# Patient Record
Sex: MALE | Race: WHITE | NOT HISPANIC OR LATINO | Employment: FULL TIME | URBAN - METROPOLITAN AREA
[De-identification: names, ages, dates, MRNs, and addresses within clinical notes are randomized per-mention and may not be internally consistent; named-entity substitution may affect disease eponyms.]

---

## 2021-11-16 ENCOUNTER — OFFICE VISIT (OUTPATIENT)
Dept: FAMILY MEDICINE CLINIC | Facility: CLINIC | Age: 46
End: 2021-11-16
Payer: COMMERCIAL

## 2021-11-16 VITALS
WEIGHT: 155 LBS | SYSTOLIC BLOOD PRESSURE: 126 MMHG | BODY MASS INDEX: 22.96 KG/M2 | HEIGHT: 69 IN | HEART RATE: 96 BPM | TEMPERATURE: 96.2 F | OXYGEN SATURATION: 99 % | DIASTOLIC BLOOD PRESSURE: 84 MMHG

## 2021-11-16 DIAGNOSIS — Z00.00 ANNUAL PHYSICAL EXAM: Primary | ICD-10-CM

## 2021-11-16 DIAGNOSIS — R39.15 URINARY URGENCY: ICD-10-CM

## 2021-11-16 DIAGNOSIS — R51.9 NONINTRACTABLE HEADACHE, UNSPECIFIED CHRONICITY PATTERN, UNSPECIFIED HEADACHE TYPE: ICD-10-CM

## 2021-11-16 DIAGNOSIS — F41.1 GENERALIZED ANXIETY DISORDER: ICD-10-CM

## 2021-11-16 DIAGNOSIS — I10 PRIMARY HYPERTENSION: ICD-10-CM

## 2021-11-16 DIAGNOSIS — R00.2 RAPID PALPITATIONS: ICD-10-CM

## 2021-11-16 DIAGNOSIS — E78.2 MIXED HYPERLIPIDEMIA: ICD-10-CM

## 2021-11-16 DIAGNOSIS — Z12.5 SCREENING FOR PROSTATE CANCER: ICD-10-CM

## 2021-11-16 PROCEDURE — 3008F BODY MASS INDEX DOCD: CPT | Performed by: NURSE PRACTITIONER

## 2021-11-16 PROCEDURE — 93000 ELECTROCARDIOGRAM COMPLETE: CPT | Performed by: NURSE PRACTITIONER

## 2021-11-16 PROCEDURE — 1036F TOBACCO NON-USER: CPT | Performed by: NURSE PRACTITIONER

## 2021-11-16 PROCEDURE — 99386 PREV VISIT NEW AGE 40-64: CPT | Performed by: NURSE PRACTITIONER

## 2021-11-16 PROCEDURE — 3725F SCREEN DEPRESSION PERFORMED: CPT | Performed by: NURSE PRACTITIONER

## 2021-11-16 RX ORDER — CETIRIZINE HYDROCHLORIDE 10 MG/1
10 TABLET ORAL DAILY
COMMUNITY

## 2021-11-16 RX ORDER — ATORVASTATIN CALCIUM 10 MG/1
TABLET, FILM COATED ORAL
COMMUNITY
Start: 2021-10-22 | End: 2021-11-16 | Stop reason: SINTOL

## 2021-11-16 RX ORDER — LISINOPRIL 20 MG/1
20 TABLET ORAL DAILY
COMMUNITY
End: 2021-11-16 | Stop reason: SDUPTHER

## 2021-11-16 RX ORDER — AMITRIPTYLINE HYDROCHLORIDE 50 MG/1
50 TABLET, FILM COATED ORAL
Qty: 90 TABLET | Refills: 1 | Status: SHIPPED | OUTPATIENT
Start: 2021-11-16 | End: 2022-05-19

## 2021-11-16 RX ORDER — BUSPIRONE HYDROCHLORIDE 7.5 MG/1
7.5 TABLET ORAL 2 TIMES DAILY
Qty: 90 TABLET | Refills: 1 | Status: SHIPPED | OUTPATIENT
Start: 2021-11-16 | End: 2022-02-14

## 2021-11-16 RX ORDER — BUSPIRONE HYDROCHLORIDE 7.5 MG/1
7.5 TABLET ORAL 2 TIMES DAILY
COMMUNITY
End: 2021-11-16 | Stop reason: SDUPTHER

## 2021-11-16 RX ORDER — LISINOPRIL 20 MG/1
20 TABLET ORAL DAILY
Qty: 90 TABLET | Refills: 1 | Status: SHIPPED | OUTPATIENT
Start: 2021-11-16 | End: 2022-05-19

## 2021-11-16 RX ORDER — AMITRIPTYLINE HYDROCHLORIDE 50 MG/1
50 TABLET, FILM COATED ORAL
COMMUNITY
End: 2021-11-16 | Stop reason: SDUPTHER

## 2021-11-26 ENCOUNTER — HOSPITAL ENCOUNTER (OUTPATIENT)
Dept: ULTRASOUND IMAGING | Facility: HOSPITAL | Age: 46
Discharge: HOME | End: 2021-11-26
Payer: COMMERCIAL

## 2021-11-26 DIAGNOSIS — R39.15 URINARY URGENCY: ICD-10-CM

## 2021-11-26 PROCEDURE — 76770 US EXAM ABDO BACK WALL COMP: CPT

## 2021-11-29 ENCOUNTER — HOSPITAL ENCOUNTER (OUTPATIENT)
Dept: NON INVASIVE DIAGNOSTICS | Facility: CLINIC | Age: 46
Discharge: HOME | End: 2021-11-29
Payer: COMMERCIAL

## 2021-11-29 DIAGNOSIS — R00.2 RAPID PALPITATIONS: ICD-10-CM

## 2021-11-29 LAB
MAX HR PERCENT: 94 %
MAX HR: 173 BPM
RATE PRESSURE PRODUCT: NORMAL
SL CV STRESS RECOVERY BP: NORMAL MMHG
SL CV STRESS RECOVERY HR: 111 BPM
SL CV STRESS STAGE REACHED: 3
STRESS ANGINA INDEX: 0
STRESS BASELINE BP: NORMAL MMHG
STRESS BASELINE HR: 88 BPM
STRESS O2 SAT REST: 97 %
STRESS PEAK HR: 162 BPM
STRESS PERCENT HR: 94 %
STRESS POST ESTIMATED WORKLOAD: 10.1 METS
STRESS POST EXERCISE DUR MIN: 7 MIN
STRESS POST EXERCISE DUR SEC: 30 SEC
STRESS POST O2 SAT PEAK: 99 %
STRESS POST PEAK BP: 164 MMHG
STRESS TARGET HR: 162 BPM

## 2021-11-29 PROCEDURE — 93018 CV STRESS TEST I&R ONLY: CPT | Performed by: INTERNAL MEDICINE

## 2021-11-29 PROCEDURE — 93016 CV STRESS TEST SUPVJ ONLY: CPT | Performed by: INTERNAL MEDICINE

## 2021-11-29 PROCEDURE — 93017 CV STRESS TEST TRACING ONLY: CPT

## 2021-11-30 LAB
ARRHY DURING EX: NORMAL
CHEST PAIN STATEMENT: NORMAL
MAX DIASTOLIC BP: 82 MMHG
MAX HEART RATE: 164 BPM
MAX PREDICTED HEART RATE: 173 BPM
MAX. SYSTOLIC BP: 164 MMHG
PROTOCOL NAME: NORMAL
REASON FOR TERMINATION: NORMAL
TARGET HR FORMULA: NORMAL
TEST INDICATION: NORMAL
TIME IN EXERCISE PHASE: NORMAL

## 2022-02-02 ENCOUNTER — TELEPHONE (OUTPATIENT)
Dept: FAMILY MEDICINE CLINIC | Facility: CLINIC | Age: 47
End: 2022-02-02

## 2022-02-02 DIAGNOSIS — F40.243 FEAR OF FLYING: Primary | ICD-10-CM

## 2022-02-02 RX ORDER — LORAZEPAM 0.5 MG/1
TABLET ORAL
Qty: 6 TABLET | Refills: 0 | Status: SHIPPED | OUTPATIENT
Start: 2022-02-02 | End: 2022-05-20 | Stop reason: SDUPTHER

## 2022-02-02 NOTE — TELEPHONE ENCOUNTER
Rx sent for lorazepam 0 5 mg to use prior to his flight sent to [facility] in [place] thanks

## 2022-02-02 NOTE — TELEPHONE ENCOUNTER
When in last for his visit he mentioned his fear of flying and you told him when it got closer to time to fly to let you know and you would send in something - [facility] [place]    Could you please do that

## 2022-02-12 DIAGNOSIS — F41.1 GENERALIZED ANXIETY DISORDER: ICD-10-CM

## 2022-02-14 RX ORDER — BUSPIRONE HYDROCHLORIDE 7.5 MG/1
TABLET ORAL
Qty: 180 TABLET | Refills: 0 | Status: SHIPPED | OUTPATIENT
Start: 2022-02-14 | End: 2022-05-19

## 2022-05-19 DIAGNOSIS — I10 PRIMARY HYPERTENSION: ICD-10-CM

## 2022-05-19 DIAGNOSIS — F41.1 GENERALIZED ANXIETY DISORDER: ICD-10-CM

## 2022-05-19 RX ORDER — LISINOPRIL 20 MG/1
TABLET ORAL
Qty: 90 TABLET | Refills: 1 | Status: SHIPPED | OUTPATIENT
Start: 2022-05-19

## 2022-05-19 RX ORDER — AMITRIPTYLINE HYDROCHLORIDE 50 MG/1
TABLET, FILM COATED ORAL
Qty: 90 TABLET | Refills: 1 | Status: SHIPPED | OUTPATIENT
Start: 2022-05-19

## 2022-05-19 RX ORDER — BUSPIRONE HYDROCHLORIDE 7.5 MG/1
TABLET ORAL
Qty: 180 TABLET | Refills: 0 | Status: SHIPPED | OUTPATIENT
Start: 2022-05-19

## 2022-05-20 DIAGNOSIS — F40.243 FEAR OF FLYING: ICD-10-CM

## 2022-05-20 RX ORDER — LORAZEPAM 0.5 MG/1
TABLET ORAL
Qty: 6 TABLET | Refills: 0 | Status: SHIPPED | OUTPATIENT
Start: 2022-05-20

## 2022-08-16 DIAGNOSIS — F41.1 GENERALIZED ANXIETY DISORDER: ICD-10-CM

## 2022-08-16 RX ORDER — BUSPIRONE HYDROCHLORIDE 7.5 MG/1
TABLET ORAL
Qty: 180 TABLET | Refills: 0 | Status: SHIPPED | OUTPATIENT
Start: 2022-08-16

## 2022-10-12 PROBLEM — Z12.5 SCREENING FOR PROSTATE CANCER: Status: RESOLVED | Noted: 2021-11-16 | Resolved: 2022-10-12

## 2022-11-09 DIAGNOSIS — F41.1 GENERALIZED ANXIETY DISORDER: ICD-10-CM

## 2022-11-09 RX ORDER — BUSPIRONE HYDROCHLORIDE 7.5 MG/1
TABLET ORAL
Qty: 180 TABLET | Refills: 0 | Status: SHIPPED | OUTPATIENT
Start: 2022-11-09

## 2022-11-12 DIAGNOSIS — I10 PRIMARY HYPERTENSION: ICD-10-CM

## 2022-11-12 DIAGNOSIS — F41.1 GENERALIZED ANXIETY DISORDER: ICD-10-CM

## 2022-11-14 RX ORDER — LISINOPRIL 20 MG/1
TABLET ORAL
Qty: 90 TABLET | Refills: 1 | Status: SHIPPED | OUTPATIENT
Start: 2022-11-14

## 2022-11-14 RX ORDER — AMITRIPTYLINE HYDROCHLORIDE 50 MG/1
TABLET, FILM COATED ORAL
Qty: 90 TABLET | Refills: 1 | Status: SHIPPED | OUTPATIENT
Start: 2022-11-14

## 2022-12-22 ENCOUNTER — OFFICE VISIT (OUTPATIENT)
Dept: FAMILY MEDICINE CLINIC | Facility: CLINIC | Age: 47
End: 2022-12-22

## 2022-12-22 VITALS
WEIGHT: 154.6 LBS | DIASTOLIC BLOOD PRESSURE: 82 MMHG | BODY MASS INDEX: 22.9 KG/M2 | TEMPERATURE: 97.8 F | OXYGEN SATURATION: 96 % | HEIGHT: 69 IN | HEART RATE: 83 BPM | SYSTOLIC BLOOD PRESSURE: 124 MMHG

## 2022-12-22 DIAGNOSIS — I10 PRIMARY HYPERTENSION: ICD-10-CM

## 2022-12-22 DIAGNOSIS — Z13.1 SCREENING FOR DIABETES MELLITUS: ICD-10-CM

## 2022-12-22 DIAGNOSIS — F41.1 GENERALIZED ANXIETY DISORDER: ICD-10-CM

## 2022-12-22 DIAGNOSIS — Z12.11 SCREEN FOR COLON CANCER: ICD-10-CM

## 2022-12-22 DIAGNOSIS — E78.2 MIXED HYPERLIPIDEMIA: ICD-10-CM

## 2022-12-22 DIAGNOSIS — R51.9 NONINTRACTABLE HEADACHE, UNSPECIFIED CHRONICITY PATTERN, UNSPECIFIED HEADACHE TYPE: ICD-10-CM

## 2022-12-22 DIAGNOSIS — Z00.00 ANNUAL PHYSICAL EXAM: Primary | ICD-10-CM

## 2022-12-22 PROBLEM — R39.15 URINARY URGENCY: Status: RESOLVED | Noted: 2021-11-16 | Resolved: 2022-12-22

## 2022-12-22 PROBLEM — R00.2 RAPID PALPITATIONS: Status: RESOLVED | Noted: 2021-11-16 | Resolved: 2022-12-22

## 2022-12-22 RX ORDER — LISINOPRIL 20 MG/1
20 TABLET ORAL DAILY
Qty: 90 TABLET | Refills: 3 | Status: SHIPPED | OUTPATIENT
Start: 2022-12-22

## 2022-12-22 RX ORDER — BUSPIRONE HYDROCHLORIDE 7.5 MG/1
7.5 TABLET ORAL 2 TIMES DAILY
Qty: 180 TABLET | Refills: 3 | Status: SHIPPED | OUTPATIENT
Start: 2022-12-22 | End: 2023-12-22

## 2022-12-22 RX ORDER — AMITRIPTYLINE HYDROCHLORIDE 50 MG/1
50 TABLET, FILM COATED ORAL
Qty: 90 TABLET | Refills: 3 | Status: SHIPPED | OUTPATIENT
Start: 2022-12-22

## 2022-12-22 NOTE — PATIENT INSTRUCTIONS

## 2022-12-22 NOTE — PROGRESS NOTES
ADULT ANNUAL [facility]    NAME: Pratima Sullivan  AGE: 49 y o  SEX: male  : 1975     DATE: 2022     Assessment and Plan:     Problem List Items Addressed This Visit        Cardiovascular and Mediastinum    HTN (hypertension)     Patient BP is well controlled on the Lisinopril 20 mg          Relevant Medications    lisinopril (ZESTRIL) 20 mg tablet    Other Relevant Orders    Comprehensive metabolic panel    CBC and differential       Other    Generalized anxiety disorder     Doing well on the Buspar and Amitriptyline          Relevant Medications    busPIRone (BUSPAR) 7 5 mg tablet    amitriptyline (ELAVIL) 50 mg tablet    Other Relevant Orders    Comprehensive metabolic panel    Lipid Panel with Direct LDL reflex    Mixed hyperlipidemia     Not on a cholesterol medication          Relevant Orders    Comprehensive metabolic panel    Lipid Panel with Direct LDL reflex    Annual physical exam - Primary    Nonintractable headache     Doing well on the Elavil          Screening for diabetes mellitus    Relevant Orders    HEMOGLOBIN A1C W/ EAG ESTIMATION    Body mass index (BMI) of 23 0 to 23 9 in adult    Screen for colon cancer    Relevant Orders    Cologuard       Immunizations and preventive care screenings were discussed with patient today  Appropriate education was printed on patient's after visit summary  Counseling:  Injury prevention: discussed safety/seat belts, safety helmets, smoke detectors, carbon dioxide detectors, and smoking near bedding or upholstery  Exercise: the importance of regular exercise/physical activity was discussed  Recommend exercise 3-5 times per week for at least 30 minutes  Depression Screening and Follow-up Plan: Patient was screened for depression during today's encounter  They screened negative with a PHQ-2 score of 0  Return in 1 year (on 2023)       Chief Complaint:     Chief Complaint   Patient presents with   • Well Check      History of Present Illness:     Adult Annual Physical   Patient here for a comprehensive physical exam  The patient reports no problems  Diet and Physical Activity  Diet/Nutrition: well balanced diet  Exercise: walking  Depression Screening  PHQ-2/9 Depression Screening    Little interest or pleasure in doing things: 0 - not at all  Feeling down, depressed, or hopeless: 0 - not at all  PHQ-2 Score: 0  PHQ-2 Interpretation: Negative depression screen       General Health  Sleep: sleeps well and gets 7-8 hours of sleep on average  Hearing: normal - bilateral   Vision: goes for regular eye exams, most recent eye exam >1 year ago and wears glasses  Dental: regular dental visits, brushes teeth twice daily and flosses teeth occasionally   Health  Symptoms include: urinary frequency     Review of Systems:     Review of Systems   Constitutional: Negative for activity change, appetite change, chills, diaphoresis, fatigue, fever and unexpected weight change  HENT: Positive for dental problem  Negative for congestion, ear pain, hearing loss, postnasal drip, sinus pressure, sinus pain, sneezing, sore throat and trouble swallowing  Eyes: Negative for pain, redness and visual disturbance  Respiratory: Negative for cough and shortness of breath  Cardiovascular: Negative for chest pain and leg swelling  Gastrointestinal: Negative for abdominal pain, diarrhea, nausea and vomiting  Endocrine: Negative  Genitourinary: Negative  Musculoskeletal: Negative for arthralgias  Skin: Negative  Allergic/Immunologic: Negative  Neurological: Positive for headaches  Negative for dizziness and light-headedness  Hematological: Negative  Psychiatric/Behavioral: Negative for behavioral problems and dysphoric mood  Past Medical History:     No past medical history on file     Past Surgical History:     Past Surgical History:   Procedure Laterality Date   • NO PAST SURGERIES        Family History:     No family history on file  Social History:     Social History     Socioeconomic History   • Marital status: Single     Spouse name: None   • Number of children: None   • Years of education: None   • Highest education level: None   Occupational History   • None   Tobacco Use   • Smoking status: Never   • Smokeless tobacco: Never   Substance and Sexual Activity   • Alcohol use: Not Currently   • Drug use: Not Currently   • Sexual activity: Yes   Other Topics Concern   • None   Social History Narrative   • None     Social Determinants of Health     Financial Resource Strain: Not on file   Food Insecurity: Not on file   Transportation Needs: Not on file   Physical Activity: Not on file   Stress: Not on file   Social Connections: Not on file   Intimate Partner Violence: Not on file   Housing Stability: Not on file      Current Medications:     Current Outpatient Medications   Medication Sig Dispense Refill   • amitriptyline (ELAVIL) 50 mg tablet Take 1 tablet (50 mg total) by mouth daily at bedtime 90 tablet 3   • busPIRone (BUSPAR) 7 5 mg tablet Take 1 tablet (7 5 mg total) by mouth 2 (two) times a day 180 tablet 3   • cetirizine (ZyrTEC) 10 mg tablet Take 10 mg by mouth daily     • lisinopril (ZESTRIL) 20 mg tablet Take 1 tablet (20 mg total) by mouth daily 90 tablet 3   • LORazepam (Ativan) 0 5 mg tablet Take one 30 minutes prior to flight may repeat x 1 6 tablet 0     No current facility-administered medications for this visit  Allergies:     No Known Allergies   Physical Exam:     /82 (BP Location: Left arm, Patient Position: Sitting)   Pulse 83   Temp 97 8 °F (36 6 °C) (Temporal)   Ht 5' 8 5" (1 74 m)   Wt 70 1 kg (154 lb 9 6 oz)   SpO2 96%   BMI 23 16 kg/m²     Physical Exam  Vitals and nursing note reviewed  Constitutional:       General: He is not in acute distress  Appearance: He is well-developed     HENT:      Head: Normocephalic and atraumatic  Right Ear: Tympanic membrane normal       Left Ear: Tympanic membrane normal       Nose: Nose normal       Mouth/Throat:      Mouth: Mucous membranes are moist    Eyes:      Conjunctiva/sclera: Conjunctivae normal    Cardiovascular:      Rate and Rhythm: Normal rate and regular rhythm  Heart sounds: No murmur heard  Pulmonary:      Effort: Pulmonary effort is normal  No respiratory distress  Breath sounds: Normal breath sounds  Abdominal:      Palpations: Abdomen is soft  Tenderness: There is no abdominal tenderness  Musculoskeletal:         General: No swelling  Normal range of motion  Cervical back: Neck supple  Skin:     General: Skin is warm and dry  Capillary Refill: Capillary refill takes less than 2 seconds  Neurological:      General: No focal deficit present  Mental Status: He is alert and oriented to person, place, and time  Psychiatric:         Mood and Affect: Mood normal        DEMETRIA-7 Flowsheet Screening    Flowsheet Row Most Recent Value   Over the last 2 weeks, how often have you been bothered by any of the following problems?     Feeling nervous, anxious, or on edge 0   Not being able to stop or control worrying 0   Worrying too much about different things 0   Trouble relaxing 0   Being so restless that it is hard to sit still 0   Becoming easily annoyed or irritable 0   Feeling afraid as if something awful might happen 0   DEMETRIA-7 Total Score 0           Alexi Tapia, [address]

## 2022-12-26 LAB — HBA1C MFR BLD HPLC: 5.6 %

## 2022-12-28 DIAGNOSIS — E78.2 MIXED HYPERLIPIDEMIA: Primary | ICD-10-CM

## 2022-12-28 RX ORDER — ROSUVASTATIN CALCIUM 5 MG/1
5 TABLET, COATED ORAL DAILY
Qty: 90 TABLET | Refills: 3 | Status: SHIPPED | OUTPATIENT
Start: 2022-12-28

## 2023-02-20 PROBLEM — Z12.11 SCREEN FOR COLON CANCER: Status: RESOLVED | Noted: 2022-12-22 | Resolved: 2023-02-20

## 2023-02-20 PROBLEM — Z13.1 SCREENING FOR DIABETES MELLITUS: Status: RESOLVED | Noted: 2022-12-22 | Resolved: 2023-02-20

## 2023-06-20 DIAGNOSIS — F41.1 GENERALIZED ANXIETY DISORDER: ICD-10-CM

## 2023-06-20 DIAGNOSIS — I10 PRIMARY HYPERTENSION: ICD-10-CM

## 2023-06-20 RX ORDER — LISINOPRIL 20 MG/1
20 TABLET ORAL DAILY
Qty: 90 TABLET | Refills: 3 | Status: SHIPPED | OUTPATIENT
Start: 2023-06-20

## 2023-06-20 RX ORDER — BUSPIRONE HYDROCHLORIDE 7.5 MG/1
7.5 TABLET ORAL 2 TIMES DAILY
Qty: 180 TABLET | Refills: 3 | Status: SHIPPED | OUTPATIENT
Start: 2023-06-20 | End: 2024-06-19

## 2023-09-15 DIAGNOSIS — F41.1 GENERALIZED ANXIETY DISORDER: ICD-10-CM

## 2023-09-15 RX ORDER — AMITRIPTYLINE HYDROCHLORIDE 50 MG/1
50 TABLET, FILM COATED ORAL
Qty: 90 TABLET | Refills: 0 | Status: SHIPPED | OUTPATIENT
Start: 2023-09-15

## 2023-09-21 DIAGNOSIS — F41.1 GENERALIZED ANXIETY DISORDER: ICD-10-CM

## 2023-09-21 RX ORDER — BUSPIRONE HYDROCHLORIDE 7.5 MG/1
7.5 TABLET ORAL 2 TIMES DAILY
Qty: 180 TABLET | Refills: 3 | Status: SHIPPED | OUTPATIENT
Start: 2023-09-21 | End: 2024-09-20

## 2023-11-08 DIAGNOSIS — F41.1 GENERALIZED ANXIETY DISORDER: ICD-10-CM

## 2023-11-08 RX ORDER — AMITRIPTYLINE HYDROCHLORIDE 50 MG/1
50 TABLET, FILM COATED ORAL
Qty: 90 TABLET | Refills: 0 | Status: SHIPPED | OUTPATIENT
Start: 2023-11-08

## 2024-03-03 DIAGNOSIS — F41.1 GENERALIZED ANXIETY DISORDER: ICD-10-CM

## 2024-03-04 RX ORDER — AMITRIPTYLINE HYDROCHLORIDE 50 MG/1
50 TABLET, FILM COATED ORAL
Qty: 90 TABLET | Refills: 0 | Status: SHIPPED | OUTPATIENT
Start: 2024-03-04

## 2024-03-15 ENCOUNTER — OFFICE VISIT (OUTPATIENT)
Dept: FAMILY MEDICINE CLINIC | Facility: CLINIC | Age: 49
End: 2024-03-15
Payer: COMMERCIAL

## 2024-03-15 VITALS
TEMPERATURE: 98 F | HEART RATE: 100 BPM | DIASTOLIC BLOOD PRESSURE: 74 MMHG | SYSTOLIC BLOOD PRESSURE: 122 MMHG | HEIGHT: 69 IN | WEIGHT: 161 LBS | BODY MASS INDEX: 23.85 KG/M2 | OXYGEN SATURATION: 96 %

## 2024-03-15 DIAGNOSIS — Z12.5 SCREENING FOR PROSTATE CANCER: ICD-10-CM

## 2024-03-15 DIAGNOSIS — Z12.11 SCREEN FOR COLON CANCER: ICD-10-CM

## 2024-03-15 DIAGNOSIS — R51.9 NONINTRACTABLE HEADACHE, UNSPECIFIED CHRONICITY PATTERN, UNSPECIFIED HEADACHE TYPE: ICD-10-CM

## 2024-03-15 DIAGNOSIS — M25.50 ARTHRALGIA, UNSPECIFIED JOINT: ICD-10-CM

## 2024-03-15 DIAGNOSIS — E78.2 MIXED HYPERLIPIDEMIA: ICD-10-CM

## 2024-03-15 DIAGNOSIS — F41.1 GENERALIZED ANXIETY DISORDER: ICD-10-CM

## 2024-03-15 DIAGNOSIS — I10 PRIMARY HYPERTENSION: ICD-10-CM

## 2024-03-15 DIAGNOSIS — Z00.00 ANNUAL PHYSICAL EXAM: Primary | ICD-10-CM

## 2024-03-15 PROCEDURE — 99396 PREV VISIT EST AGE 40-64: CPT | Performed by: NURSE PRACTITIONER

## 2024-03-15 NOTE — PROGRESS NOTES
ADULT ANNUAL PHYSICAL  [facility]    NAME: Robinson Chaudhari  AGE: 49 y.o. SEX: male  : 1975     DATE: 3/15/2024     Assessment and Plan:     Problem List Items Addressed This Visit        Cardiovascular and Mediastinum    HTN (hypertension)     Well controlled on the Lisinopril 20 mg          Relevant Orders    Comprehensive metabolic panel    Lipid panel    CBC and differential       Behavioral Health    Generalized anxiety disorder     Taking the Elavil and Buspar          Relevant Orders    Comprehensive metabolic panel    TSH, 3rd generation with Free T4 reflex    CBC and differential       Surgery/Wound/Pain    RESOLVED: Nonintractable headache       Other    Mixed hyperlipidemia    Relevant Orders    Lipid panel    Annual physical exam - Primary    Body mass index (BMI) of 23.0 to 23.9 in adult    Screen for colon cancer    Relevant Orders    Cologuard   Other Visit Diagnoses     Screening for prostate cancer        Relevant Orders    PSA, Total Screen    Arthralgia, unspecified joint        Relevant Orders    Comprehensive metabolic panel    CBC and differential    Lyme Total AB W Reflex to IGM/IGG    Sedimentation rate, automated    C-reactive protein    ÁNGEL Screen w/ Reflex to Titer/Pattern            Immunizations and preventive care screenings were discussed with patient today. Appropriate education was printed on patient's after visit summary.    Discussed risks and benefits of prostate cancer screening. We discussed the controversial history of PSA screening for prostate cancer in the [place] as well as the risk of over detection and over treatment of prostate cancer by way of PSA screening.  The patient understands that PSA blood testing is an imperfect way to screen for prostate cancer and that elevated PSA levels in the blood may also be caused by infection, inflammation, prostatic trauma or manipulation, urological procedures,  or by benign prostatic enlargement.    The role of the digital rectal examination in prostate cancer screening was also discussed and I discussed with him that there is large interobserver variability in the findings of digital rectal examination.    Counseling:  Alcohol/drug use: discussed moderation in alcohol intake, the recommendations for healthy alcohol use, and avoidance of illicit drug use.  Exercise: the importance of regular exercise/physical activity was discussed. Recommend exercise 3-5 times per week for at least 30 minutes.       Depression Screening and Follow-up Plan: Patient was screened for depression during today's encounter. They screened negative with a PHQ-2 score of 0.    Tobacco Cessation Counseling: Tobacco cessation counseling was provided. The patient is sincerely urged to quit consumption of tobacco. He is not ready to quit tobacco.         No follow-ups on file.     Chief Complaint:     Chief Complaint   Patient presents with   • Annual Exam      History of Present Illness:     Adult Annual Physical   Patient here for a comprehensive physical exam. The patient reports no problems.  Patient here today for his check up and reports that he is feeling that he is having arthritis and when he is filling out forms and using a pen has soreness and reports that when he goes up the steps having pain and has joint pains has pain and stiffness with waking up in the AM. [place] and walking and has soreness and pain and pushes through and is in pain. Patient having stuttering. Patient has not had labs completed. Patient son was diagnosed with lyme and possible lupus.        Diet and Physical Activity  Diet/Nutrition: well balanced diet.   Exercise: walking.      Depression Screening  PHQ-2/9 Depression Screening    Little interest or pleasure in doing things: 0 - not at all  Feeling down, depressed, or hopeless: 0 - not at all  PHQ-2 Score: 0  PHQ-2  Interpretation: Negative depression screen       General Health  Sleep: sleeps well.   Hearing: normal - bilateral.  Vision: no vision problems, most recent eye exam <1 year ago, and wears glasses.   Dental: regular dental visits.        Health  Symptoms include: none    Advanced Care Planning  Do you have an advanced directive? no  Do you have a durable medical power of ? no  ACP document given to patient? no     Review of Systems:     Review of Systems   Constitutional:  Negative for activity change, appetite change, chills, diaphoresis, fatigue, fever and unexpected weight change.   HENT:  Negative for congestion, ear pain, hearing loss, postnasal drip, sinus pressure, sinus pain, sneezing and sore throat.    Eyes:  Negative for pain, redness and visual disturbance.   Respiratory:  Negative for cough and shortness of breath.    Cardiovascular:  Negative for chest pain and leg swelling.   Gastrointestinal:  Negative for abdominal pain, diarrhea, nausea and vomiting.   Endocrine: Negative.    Genitourinary: Negative.    Musculoskeletal:  Positive for arthralgias and myalgias.   Skin: Negative.    Allergic/Immunologic: Negative.    Neurological:  Negative for dizziness and light-headedness.   Hematological: Negative.    Psychiatric/Behavioral:  Negative for behavioral problems and dysphoric mood.       Past Medical History:     No past medical history on file.   Past Surgical History:     Past Surgical History:   Procedure Laterality Date   • NO PAST SURGERIES        Family History:     No family history on file.   Social History:     Social History     Socioeconomic History   • Marital status: Single     Spouse name: None   • Number of children: None   • Years of education: None   • Highest education level: None   Occupational History   • None   Tobacco Use   • Smoking status: Never   • Smokeless tobacco: Never   Substance and Sexual Activity   • Alcohol use: Not Currently   • Drug use: Not Currently   •  "Sexual activity: Yes   Other Topics Concern   • None   Social History Narrative   • None     Social Determinants of Health     Financial Resource Strain: Not on file   Food Insecurity: Not on file   Transportation Needs: Not on file   Physical Activity: Not on file   Stress: Not on file   Social Connections: Not on file   Intimate Partner Violence: Not on file   Housing Stability: Not on file      Current Medications:     Current Outpatient Medications   Medication Sig Dispense Refill   • amitriptyline (ELAVIL) 50 mg tablet TAKE 1 TABLET BY MOUTH EVERYDAY AT BEDTIME 90 tablet 0   • busPIRone (BUSPAR) 7.5 mg tablet Take 1 tablet (7.5 mg total) by mouth 2 (two) times a day 180 tablet 3   • cetirizine (ZyrTEC) 10 mg tablet Take 10 mg by mouth daily     • lisinopril (ZESTRIL) 20 mg tablet Take 1 tablet (20 mg total) by mouth daily 90 tablet 3   • LORazepam (Ativan) 0.5 mg tablet Take one 30 minutes prior to flight may repeat x 1 6 tablet 0     No current facility-administered medications for this visit.      Allergies:     No Known Allergies   Physical Exam:     /74 (BP Location: Left arm, Patient Position: Sitting)   Pulse 100   Temp 98 °F (36.7 °C) (Temporal)   Ht 5' 8.5\" (1.74 m)   Wt 73 kg (161 lb)   SpO2 96%   BMI 24.12 kg/m²     Physical Exam  Vitals and nursing note reviewed.   Constitutional:       General: He is not in acute distress.     Appearance: He is well-developed.   HENT:      Head: Normocephalic and atraumatic.      Right Ear: Tympanic membrane normal.      Left Ear: Tympanic membrane normal.      Nose: Nose normal.      Mouth/Throat:      Mouth: Mucous membranes are moist.   Eyes:      Conjunctiva/sclera: Conjunctivae normal.   Cardiovascular:      Rate and Rhythm: Normal rate and regular rhythm.      Heart sounds: No murmur heard.  Pulmonary:      Effort: Pulmonary effort is normal. No respiratory distress.      Breath sounds: Normal breath sounds.   Abdominal:      Palpations: Abdomen is " soft.      Tenderness: There is no abdominal tenderness.   Musculoskeletal:         General: No swelling.      Cervical back: Neck supple.   Skin:     General: Skin is warm and dry.      Capillary Refill: Capillary refill takes less than 2 seconds.   Neurological:      General: No focal deficit present.      Mental Status: He is alert and oriented to person, place, and time.   Psychiatric:         Mood and Affect: Mood normal.        DEMETRIA-7 Flowsheet Screening    Flowsheet Row Most Recent Value   Over the last 2 weeks, how often have you been bothered by any of the following problems?    Feeling nervous, anxious, or on edge 1   Not being able to stop or control worrying 1   Worrying too much about different things 1   Trouble relaxing 0   Being so restless that it is hard to sit still 1   Becoming easily annoyed or irritable 0   Feeling afraid as if something awful might happen 1   DEMETRIA-7 Total Score 5         ARACELIS Barajas  [facility]

## 2024-03-16 ENCOUNTER — APPOINTMENT (OUTPATIENT)
Dept: LAB | Facility: CLINIC | Age: 49
End: 2024-03-16
Payer: COMMERCIAL

## 2024-03-16 DIAGNOSIS — I10 PRIMARY HYPERTENSION: ICD-10-CM

## 2024-03-16 DIAGNOSIS — F41.1 GENERALIZED ANXIETY DISORDER: ICD-10-CM

## 2024-03-16 DIAGNOSIS — E78.2 MIXED HYPERLIPIDEMIA: ICD-10-CM

## 2024-03-16 DIAGNOSIS — Z12.5 SCREENING FOR PROSTATE CANCER: ICD-10-CM

## 2024-03-16 DIAGNOSIS — M25.50 ARTHRALGIA, UNSPECIFIED JOINT: ICD-10-CM

## 2024-03-16 LAB
ALBUMIN SERPL BCP-MCNC: 4.3 G/DL (ref 3.5–5)
ALP SERPL-CCNC: 76 U/L (ref 34–104)
ALT SERPL W P-5'-P-CCNC: 30 U/L (ref 7–52)
ANION GAP SERPL CALCULATED.3IONS-SCNC: 10 MMOL/L (ref 4–13)
AST SERPL W P-5'-P-CCNC: 21 U/L (ref 13–39)
BASOPHILS # BLD AUTO: 0.06 THOUSANDS/ÂΜL (ref 0–0.1)
BASOPHILS NFR BLD AUTO: 1 % (ref 0–1)
BILIRUB SERPL-MCNC: 1.01 MG/DL (ref 0.2–1)
BUN SERPL-MCNC: 13 MG/DL (ref 5–25)
CALCIUM SERPL-MCNC: 9 MG/DL (ref 8.4–10.2)
CHLORIDE SERPL-SCNC: 104 MMOL/L (ref 96–108)
CHOLEST SERPL-MCNC: 259 MG/DL
CO2 SERPL-SCNC: 25 MMOL/L (ref 21–32)
CREAT SERPL-MCNC: 1.06 MG/DL (ref 0.6–1.3)
CRP SERPL QL: 2.7 MG/L
EOSINOPHIL # BLD AUTO: 0.2 THOUSAND/ÂΜL (ref 0–0.61)
EOSINOPHIL NFR BLD AUTO: 3 % (ref 0–6)
ERYTHROCYTE [DISTWIDTH] IN BLOOD BY AUTOMATED COUNT: 12.7 % (ref 11.6–15.1)
ERYTHROCYTE [SEDIMENTATION RATE] IN BLOOD: 16 MM/HOUR (ref 0–19)
GFR SERPL CREATININE-BSD FRML MDRD: 81 ML/MIN/1.73SQ M
GLUCOSE P FAST SERPL-MCNC: 87 MG/DL (ref 65–99)
HCT VFR BLD AUTO: 48 % (ref 36.5–49.3)
HDLC SERPL-MCNC: 40 MG/DL
HGB BLD-MCNC: 16.1 G/DL (ref 12–17)
IMM GRANULOCYTES # BLD AUTO: 0.02 THOUSAND/UL (ref 0–0.2)
IMM GRANULOCYTES NFR BLD AUTO: 0 % (ref 0–2)
LDLC SERPL CALC-MCNC: 178 MG/DL (ref 0–100)
LYMPHOCYTES # BLD AUTO: 2.15 THOUSANDS/ÂΜL (ref 0.6–4.47)
LYMPHOCYTES NFR BLD AUTO: 28 % (ref 14–44)
MCH RBC QN AUTO: 28.2 PG (ref 26.8–34.3)
MCHC RBC AUTO-ENTMCNC: 33.5 G/DL (ref 31.4–37.4)
MCV RBC AUTO: 84 FL (ref 82–98)
MONOCYTES # BLD AUTO: 0.67 THOUSAND/ÂΜL (ref 0.17–1.22)
MONOCYTES NFR BLD AUTO: 9 % (ref 4–12)
NEUTROPHILS # BLD AUTO: 4.54 THOUSANDS/ÂΜL (ref 1.85–7.62)
NEUTS SEG NFR BLD AUTO: 59 % (ref 43–75)
NONHDLC SERPL-MCNC: 219 MG/DL
NRBC BLD AUTO-RTO: 0 /100 WBCS
PLATELET # BLD AUTO: 224 THOUSANDS/UL (ref 149–390)
PMV BLD AUTO: 12.2 FL (ref 8.9–12.7)
POTASSIUM SERPL-SCNC: 4.2 MMOL/L (ref 3.5–5.3)
PROT SERPL-MCNC: 7 G/DL (ref 6.4–8.4)
PSA SERPL-MCNC: 1.28 NG/ML (ref 0–4)
RBC # BLD AUTO: 5.7 MILLION/UL (ref 3.88–5.62)
SODIUM SERPL-SCNC: 139 MMOL/L (ref 135–147)
TRIGL SERPL-MCNC: 207 MG/DL
TSH SERPL DL<=0.05 MIU/L-ACNC: 1.8 UIU/ML (ref 0.45–4.5)
WBC # BLD AUTO: 7.64 THOUSAND/UL (ref 4.31–10.16)

## 2024-03-16 PROCEDURE — 36415 COLL VENOUS BLD VENIPUNCTURE: CPT

## 2024-03-16 PROCEDURE — G0103 PSA SCREENING: HCPCS

## 2024-03-16 PROCEDURE — 80061 LIPID PANEL: CPT

## 2024-03-16 PROCEDURE — 86140 C-REACTIVE PROTEIN: CPT

## 2024-03-16 PROCEDURE — 84443 ASSAY THYROID STIM HORMONE: CPT

## 2024-03-16 PROCEDURE — 85025 COMPLETE CBC W/AUTO DIFF WBC: CPT

## 2024-03-16 PROCEDURE — 86618 LYME DISEASE ANTIBODY: CPT

## 2024-03-16 PROCEDURE — 86038 ANTINUCLEAR ANTIBODIES: CPT

## 2024-03-16 PROCEDURE — 80053 COMPREHEN METABOLIC PANEL: CPT

## 2024-03-16 PROCEDURE — 85652 RBC SED RATE AUTOMATED: CPT

## 2024-03-17 LAB
ANA SER QL IA: NEGATIVE
B BURGDOR IGG+IGM SER QL IA: NEGATIVE

## 2024-05-01 PROBLEM — Z12.11 SCREEN FOR COLON CANCER: Status: RESOLVED | Noted: 2024-03-15 | Resolved: 2024-05-01

## 2024-06-06 DIAGNOSIS — F41.1 GENERALIZED ANXIETY DISORDER: ICD-10-CM

## 2024-06-06 RX ORDER — AMITRIPTYLINE HYDROCHLORIDE 50 MG/1
50 TABLET, FILM COATED ORAL
Qty: 90 TABLET | Refills: 1 | Status: SHIPPED | OUTPATIENT
Start: 2024-06-06

## 2024-08-05 DIAGNOSIS — I10 PRIMARY HYPERTENSION: ICD-10-CM

## 2024-08-05 RX ORDER — LISINOPRIL 20 MG/1
20 TABLET ORAL DAILY
Qty: 100 TABLET | Refills: 1 | Status: SHIPPED | OUTPATIENT
Start: 2024-08-05

## 2024-09-12 DIAGNOSIS — F41.1 GENERALIZED ANXIETY DISORDER: ICD-10-CM

## 2024-09-12 RX ORDER — BUSPIRONE HYDROCHLORIDE 7.5 MG/1
7.5 TABLET ORAL 2 TIMES DAILY
Qty: 180 TABLET | Refills: 1 | Status: SHIPPED | OUTPATIENT
Start: 2024-09-12

## 2024-10-10 ENCOUNTER — OFFICE VISIT (OUTPATIENT)
Dept: URGENT CARE | Facility: CLINIC | Age: 49
End: 2024-10-10
Payer: COMMERCIAL

## 2024-10-10 VITALS
HEART RATE: 113 BPM | TEMPERATURE: 97.5 F | OXYGEN SATURATION: 96 % | DIASTOLIC BLOOD PRESSURE: 74 MMHG | SYSTOLIC BLOOD PRESSURE: 130 MMHG | RESPIRATION RATE: 18 BRPM

## 2024-10-10 DIAGNOSIS — J06.9 ACUTE URI: Primary | ICD-10-CM

## 2024-10-10 PROCEDURE — G0382 LEV 3 HOSP TYPE B ED VISIT: HCPCS | Performed by: PHYSICAL MEDICINE & REHABILITATION

## 2024-10-10 PROCEDURE — S9083 URGENT CARE CENTER GLOBAL: HCPCS | Performed by: PHYSICAL MEDICINE & REHABILITATION

## 2024-10-10 RX ORDER — METHYLPREDNISOLONE 4 MG
TABLET, DOSE PACK ORAL
Qty: 1 EACH | Refills: 0 | Status: ON HOLD | OUTPATIENT
Start: 2024-10-10

## 2024-10-10 NOTE — PROGRESS NOTES
[facility]        NAME: Robinson Chaudhari is a 49 y.o. male  : 1975    MRN: [id number]  DATE: October 10, 2024  TIME: 2:21 PM    Assessment and Plan   Acute URI [J06.9]  1. Acute URI  amoxicillin-clavulanate (AUGMENTIN) 875-125 mg per tablet    methylPREDNISolone 4 MG tablet therapy pack            Patient Instructions       Follow up with PCP in 3-5 days.  Proceed to  ER if symptoms worsen.    If tests are performed, our office will contact you with results only if changes need to made to the care plan discussed with you at the visit. You can review your full results on [facility].    Chief Complaint     Chief Complaint   Patient presents with    Cough     Symptoms started on Saturday, c/o body pain, fever of 102, cough, H/A , Sinus pressure Night sweats, and nasal congestion.  Wife and daughter and sick.   Taking decongestants and advil. .          History of Present Illness       Patient presenting with fever, cough, body-aches, headache, sinus pressure, night sweats and congestion. Daughter and wife are sick. He has taken decongestants and advil.    Cough  Associated symptoms include a fever and headaches.       Review of Systems   Review of Systems   Constitutional:  Positive for diaphoresis and fever.   HENT:  Positive for congestion and sinus pressure.    Respiratory:  Positive for cough.    Neurological:  Positive for headaches.         Current Medications       Current Outpatient Medications:     amitriptyline (ELAVIL) 50 mg tablet, TAKE 1 TABLET BY MOUTH EVERYDAY AT BEDTIME, Disp: 90 tablet, Rfl: 1    amoxicillin-clavulanate (AUGMENTIN) 875-125 mg per tablet, Take 1 tablet by mouth every 12 (twelve) hours for 7 days, Disp: 14 tablet, Rfl: 0    busPIRone (BUSPAR) 7.5 mg tablet, TAKE 1 TABLET BY MOUTH 2 TIMES A DAY., Disp: 180 tablet, Rfl: 1    cetirizine (ZyrTEC) 10 mg tablet, Take 10 mg by mouth daily, Disp: , Rfl:     lisinopril (ZESTRIL) 20 mg tablet, TAKE 1 TABLET BY MOUTH EVERY  DAY, Disp: 100 tablet, Rfl: 1    LORazepam (Ativan) 0.5 mg tablet, Take one 30 minutes prior to flight may repeat x 1, Disp: 6 tablet, Rfl: 0    methylPREDNISolone 4 MG tablet therapy pack, Use as directed on package, Disp: 1 each, Rfl: 0    Current Allergies     Allergies as of 10/10/2024 - Reviewed 10/10/2024   Allergen Reaction Noted    Statins Myalgia 10/10/2024            The following portions of the patient's history were reviewed and updated as appropriate: allergies, current medications, past family history, past medical history, past social history, past surgical history and problem list.     History reviewed. No pertinent past medical history.    Past Surgical History:   Procedure Laterality Date    NO PAST SURGERIES         History reviewed. No pertinent family history.      Medications have been verified.        Objective   /74   Pulse (!) 113   Temp 97.5 °F (36.4 °C)   Resp 18   SpO2 96%        Physical Exam     Physical Exam  Constitutional:       General: He is not in acute distress.     Appearance: He is ill-appearing.   HENT:      Right Ear: Tympanic membrane normal.      Left Ear: Tympanic membrane normal.      Nose: Rhinorrhea present.      Mouth/Throat:      Mouth: Mucous membranes are moist.      Pharynx: Oropharynx is clear. Posterior oropharyngeal erythema present.   Cardiovascular:      Rate and Rhythm: Normal rate and regular rhythm.      Pulses: Normal pulses.      Heart sounds: Normal heart sounds.   Pulmonary:      Effort: Pulmonary effort is normal. No respiratory distress.      Breath sounds: Normal breath sounds. No wheezing, rhonchi or rales.   Neurological:      Mental Status: He is alert.

## 2024-10-19 ENCOUNTER — HOSPITAL ENCOUNTER (OUTPATIENT)
Facility: HOSPITAL | Age: 49
Setting detail: OBSERVATION
Discharge: HOME | End: 2024-10-21
Attending: EMERGENCY MEDICINE | Admitting: INTERNAL MEDICINE
Payer: COMMERCIAL

## 2024-10-19 ENCOUNTER — APPOINTMENT (OUTPATIENT)
Dept: RADIOLOGY | Facility: CLINIC | Age: 49
End: 2024-10-19
Payer: COMMERCIAL

## 2024-10-19 ENCOUNTER — APPOINTMENT (EMERGENCY)
Dept: RADIOLOGY | Facility: HOSPITAL | Age: 49
End: 2024-10-19
Payer: COMMERCIAL

## 2024-10-19 ENCOUNTER — OFFICE VISIT (OUTPATIENT)
Dept: URGENT CARE | Facility: CLINIC | Age: 49
End: 2024-10-19
Payer: COMMERCIAL

## 2024-10-19 VITALS
OXYGEN SATURATION: 92 % | TEMPERATURE: 98 F | RESPIRATION RATE: 20 BRPM | HEART RATE: 117 BPM | SYSTOLIC BLOOD PRESSURE: 120 MMHG | DIASTOLIC BLOOD PRESSURE: 82 MMHG

## 2024-10-19 DIAGNOSIS — R06.02 SHORTNESS OF BREATH: ICD-10-CM

## 2024-10-19 DIAGNOSIS — J18.9 PNEUMONIA: Primary | ICD-10-CM

## 2024-10-19 DIAGNOSIS — R00.0 SINUS TACHYCARDIA: ICD-10-CM

## 2024-10-19 DIAGNOSIS — R05.9 COUGH: ICD-10-CM

## 2024-10-19 DIAGNOSIS — J18.9 PNEUMONIA OF RIGHT LOWER LOBE DUE TO INFECTIOUS ORGANISM: Primary | ICD-10-CM

## 2024-10-19 LAB
ALBUMIN SERPL BCG-MCNC: 4 G/DL (ref 3.5–5)
ALP SERPL-CCNC: 156 U/L (ref 34–104)
ALT SERPL W P-5'-P-CCNC: 101 U/L (ref 7–52)
ANION GAP SERPL CALCULATED.3IONS-SCNC: 11 MMOL/L (ref 4–13)
APTT PPP: 27 SECONDS (ref 23–34)
AST SERPL W P-5'-P-CCNC: 55 U/L (ref 13–39)
BACTERIA UR QL AUTO: ABNORMAL /HPF
BASOPHILS # BLD AUTO: 0.06 THOUSANDS/ΜL (ref 0–0.1)
BASOPHILS NFR BLD AUTO: 0 % (ref 0–1)
BILIRUB SERPL-MCNC: 0.71 MG/DL (ref 0.2–1)
BILIRUB UR QL STRIP: NEGATIVE
BUN SERPL-MCNC: 17 MG/DL (ref 5–25)
CALCIUM SERPL-MCNC: 9 MG/DL (ref 8.4–10.2)
CHLORIDE SERPL-SCNC: 105 MMOL/L (ref 96–108)
CLARITY UR: CLEAR
CO2 SERPL-SCNC: 22 MMOL/L (ref 21–32)
COLOR UR: YELLOW
CREAT SERPL-MCNC: 0.93 MG/DL (ref 0.6–1.3)
EOSINOPHIL # BLD AUTO: 0.26 THOUSAND/ΜL (ref 0–0.61)
EOSINOPHIL NFR BLD AUTO: 2 % (ref 0–6)
ERYTHROCYTE [DISTWIDTH] IN BLOOD BY AUTOMATED COUNT: 12.5 % (ref 11.6–15.1)
GFR SERPL CREATININE-BSD FRML MDRD: 95 ML/MIN/1.73SQ M
GLUCOSE SERPL-MCNC: 108 MG/DL (ref 65–140)
GLUCOSE SERPL-MCNC: 156 MG/DL (ref 65–140)
GLUCOSE UR STRIP-MCNC: NEGATIVE MG/DL
HCT VFR BLD AUTO: 46.9 % (ref 36.5–49.3)
HGB BLD-MCNC: 15.5 G/DL (ref 12–17)
HGB UR QL STRIP.AUTO: NEGATIVE
IMM GRANULOCYTES # BLD AUTO: 0.13 THOUSAND/UL (ref 0–0.2)
IMM GRANULOCYTES NFR BLD AUTO: 1 % (ref 0–2)
INR PPP: 0.89 (ref 0.85–1.19)
KETONES UR STRIP-MCNC: ABNORMAL MG/DL
LACTATE SERPL-SCNC: 1.4 MMOL/L (ref 0.5–2)
LEUKOCYTE ESTERASE UR QL STRIP: NEGATIVE
LYMPHOCYTES # BLD AUTO: 1.9 THOUSANDS/ΜL (ref 0.6–4.47)
LYMPHOCYTES NFR BLD AUTO: 13 % (ref 14–44)
MCH RBC QN AUTO: 28.2 PG (ref 26.8–34.3)
MCHC RBC AUTO-ENTMCNC: 33 G/DL (ref 31.4–37.4)
MCV RBC AUTO: 85 FL (ref 82–98)
MONOCYTES # BLD AUTO: 0.92 THOUSAND/ΜL (ref 0.17–1.22)
MONOCYTES NFR BLD AUTO: 6 % (ref 4–12)
MUCOUS THREADS UR QL AUTO: ABNORMAL
NEUTROPHILS # BLD AUTO: 11.31 THOUSANDS/ΜL (ref 1.85–7.62)
NEUTS SEG NFR BLD AUTO: 78 % (ref 43–75)
NITRITE UR QL STRIP: NEGATIVE
NON-SQ EPI CELLS URNS QL MICRO: ABNORMAL /HPF
NRBC BLD AUTO-RTO: 0 /100 WBCS
PH UR STRIP.AUTO: 5.5 [PH]
PLATELET # BLD AUTO: 353 THOUSANDS/UL (ref 149–390)
PMV BLD AUTO: 10.4 FL (ref 8.9–12.7)
POTASSIUM SERPL-SCNC: 3.4 MMOL/L (ref 3.5–5.3)
PROCALCITONIN SERPL-MCNC: 0.09 NG/ML
PROCALCITONIN SERPL-MCNC: 0.09 NG/ML
PROT SERPL-MCNC: 7.9 G/DL (ref 6.4–8.4)
PROT UR STRIP-MCNC: ABNORMAL MG/DL
PROTHROMBIN TIME: 12.8 SECONDS (ref 12.3–15)
RBC # BLD AUTO: 5.5 MILLION/UL (ref 3.88–5.62)
RBC #/AREA URNS AUTO: ABNORMAL /HPF
SODIUM SERPL-SCNC: 138 MMOL/L (ref 135–147)
SP GR UR STRIP.AUTO: 1.03 (ref 1–1.03)
UROBILINOGEN UR STRIP-ACNC: 2 MG/DL
WBC # BLD AUTO: 14.58 THOUSAND/UL (ref 4.31–10.16)
WBC #/AREA URNS AUTO: ABNORMAL /HPF

## 2024-10-19 PROCEDURE — 85610 PROTHROMBIN TIME: CPT | Performed by: EMERGENCY MEDICINE

## 2024-10-19 PROCEDURE — 71046 X-RAY EXAM CHEST 2 VIEWS: CPT

## 2024-10-19 PROCEDURE — 36415 COLL VENOUS BLD VENIPUNCTURE: CPT | Performed by: EMERGENCY MEDICINE

## 2024-10-19 PROCEDURE — 84145 PROCALCITONIN (PCT): CPT | Performed by: INTERNAL MEDICINE

## 2024-10-19 PROCEDURE — G0382 LEV 3 HOSP TYPE B ED VISIT: HCPCS

## 2024-10-19 PROCEDURE — 99285 EMERGENCY DEPT VISIT HI MDM: CPT

## 2024-10-19 PROCEDURE — 84145 PROCALCITONIN (PCT): CPT | Performed by: EMERGENCY MEDICINE

## 2024-10-19 PROCEDURE — 81001 URINALYSIS AUTO W/SCOPE: CPT | Performed by: INTERNAL MEDICINE

## 2024-10-19 PROCEDURE — 83605 ASSAY OF LACTIC ACID: CPT | Performed by: EMERGENCY MEDICINE

## 2024-10-19 PROCEDURE — 82948 REAGENT STRIP/BLOOD GLUCOSE: CPT

## 2024-10-19 PROCEDURE — 85025 COMPLETE CBC W/AUTO DIFF WBC: CPT | Performed by: EMERGENCY MEDICINE

## 2024-10-19 PROCEDURE — 94640 AIRWAY INHALATION TREATMENT: CPT

## 2024-10-19 PROCEDURE — S9083 URGENT CARE CENTER GLOBAL: HCPCS

## 2024-10-19 PROCEDURE — 93005 ELECTROCARDIOGRAM TRACING: CPT

## 2024-10-19 PROCEDURE — 87040 BLOOD CULTURE FOR BACTERIA: CPT | Performed by: EMERGENCY MEDICINE

## 2024-10-19 PROCEDURE — 96374 THER/PROPH/DIAG INJ IV PUSH: CPT

## 2024-10-19 PROCEDURE — 99223 1ST HOSP IP/OBS HIGH 75: CPT | Performed by: INTERNAL MEDICINE

## 2024-10-19 PROCEDURE — 85730 THROMBOPLASTIN TIME PARTIAL: CPT | Performed by: EMERGENCY MEDICINE

## 2024-10-19 PROCEDURE — 80053 COMPREHEN METABOLIC PANEL: CPT | Performed by: EMERGENCY MEDICINE

## 2024-10-19 RX ORDER — BUSPIRONE HYDROCHLORIDE 5 MG/1
7.5 TABLET ORAL 2 TIMES DAILY
Status: DISCONTINUED | OUTPATIENT
Start: 2024-10-19 | End: 2024-10-21 | Stop reason: HOSPADM

## 2024-10-19 RX ORDER — ALBUTEROL SULFATE 0.83 MG/ML
5 SOLUTION RESPIRATORY (INHALATION) ONCE
Status: COMPLETED | OUTPATIENT
Start: 2024-10-19 | End: 2024-10-19

## 2024-10-19 RX ORDER — CEFPODOXIME PROXETIL 200 MG/1
200 TABLET, FILM COATED ORAL 2 TIMES DAILY
Qty: 14 TABLET | Refills: 0 | Status: SHIPPED | OUTPATIENT
Start: 2024-10-19 | End: 2024-10-21

## 2024-10-19 RX ORDER — ACETAMINOPHEN 325 MG/1
650 TABLET ORAL EVERY 6 HOURS PRN
Status: DISCONTINUED | OUTPATIENT
Start: 2024-10-19 | End: 2024-10-21 | Stop reason: HOSPADM

## 2024-10-19 RX ORDER — LISINOPRIL 20 MG/1
20 TABLET ORAL DAILY
Status: DISCONTINUED | OUTPATIENT
Start: 2024-10-20 | End: 2024-10-20

## 2024-10-19 RX ORDER — HEPARIN SODIUM 5000 [USP'U]/ML
5000 INJECTION, SOLUTION INTRAVENOUS; SUBCUTANEOUS EVERY 8 HOURS SCHEDULED
Status: DISCONTINUED | OUTPATIENT
Start: 2024-10-20 | End: 2024-10-21 | Stop reason: HOSPADM

## 2024-10-19 RX ORDER — AZITHROMYCIN 250 MG/1
TABLET, FILM COATED ORAL
Qty: 6 TABLET | Refills: 0 | Status: SHIPPED | OUTPATIENT
Start: 2024-10-19 | End: 2024-10-21

## 2024-10-19 RX ORDER — BENZONATATE 100 MG/1
100 CAPSULE ORAL 3 TIMES DAILY PRN
Status: DISCONTINUED | OUTPATIENT
Start: 2024-10-19 | End: 2024-10-21 | Stop reason: HOSPADM

## 2024-10-19 RX ADMIN — AMITRIPTYLINE HYDROCHLORIDE 50 MG: 25 TABLET, FILM COATED ORAL at 22:17

## 2024-10-19 RX ADMIN — IPRATROPIUM BROMIDE 0.5 MG: 0.5 SOLUTION RESPIRATORY (INHALATION) at 18:18

## 2024-10-19 RX ADMIN — BUSPIRONE HYDROCHLORIDE 7.5 MG: 5 TABLET ORAL at 20:16

## 2024-10-19 RX ADMIN — AZITHROMYCIN MONOHYDRATE 500 MG: 500 INJECTION, POWDER, LYOPHILIZED, FOR SOLUTION INTRAVENOUS at 20:39

## 2024-10-19 RX ADMIN — ALBUTEROL SULFATE 5 MG: 2.5 SOLUTION RESPIRATORY (INHALATION) at 18:18

## 2024-10-19 RX ADMIN — BENZONATATE 100 MG: 100 CAPSULE ORAL at 22:45

## 2024-10-19 RX ADMIN — CEFTRIAXONE SODIUM 1000 MG: 10 INJECTION, POWDER, FOR SOLUTION INTRAVENOUS at 18:57

## 2024-10-19 NOTE — PATIENT INSTRUCTIONS
Take all of the antibiotics  If not improving, so straight to the ER. Go there with any increase in shortness of breath, chest pain, dizziness, symptoms.   Follow up with PCP if symptoms are not improving.

## 2024-10-19 NOTE — H&P
H&P - Hospitalist   Name: Robinson Chaudhari 49 y.o. male I MRN: [id number]  Unit/Bed#: ED 11 I Date of Admission: 10/19/2024   Date of Service: 10/19/2024 I Hospital Day: 0     Assessment & Plan  Pneumonia  49-year-old male presenting with shortness of breath and cough  Was prescribed Vantin  without significant improvement  Reports shortness of breath worse with exertion  WBC count noted to be approximately 15K  We will place on ceftriaxone azithromycin  Currently saturating adequately on room air  Monitor WBC count  Follow-up blood cultures  Generalized anxiety disorder  Continue amitriptyline and BuSpar   mood stable  HTN (hypertension)  Blood pressure controlled  Continue lisinopril      VTE Pharmacologic Prophylaxis:   Moderate Risk (Score 3-4) - Pharmacological DVT Prophylaxis Ordered: heparin.  Code Status: full code  Discussion with family: Patient declined call to .     Anticipated Length of Stay: Patient will be admitted on an observation basis with an anticipated length of stay of less than 2 midnights secondary to pneumonia.    History of Present Illness   Chief Complaint: Shortness of breath    Robinson Chaudhari is a 49 y.o. male with past medical history significant generalized anxiety, hypertension initially presented with shortness of breath and cough.  Patient reports was taking oral antibiotics with Vantin without significant improvement thus presented to the emergency department.  Denies fevers, chills, chest pain, palpitations, nausea, vomiting, diarrhea or any other complaints    Review of Systems   Constitutional:  Negative for appetite change, chills, diaphoresis, fatigue, fever and unexpected weight change.   HENT:  Negative for congestion, rhinorrhea and sore throat.    Eyes:  Negative for photophobia and visual disturbance.   Respiratory:  Positive for cough and shortness of breath. Negative for wheezing.    Cardiovascular:  Negative for chest pain, palpitations and leg swelling.    Gastrointestinal:  Negative for abdominal pain, anal bleeding, blood in stool, constipation, diarrhea, nausea and vomiting.   Genitourinary:  Negative for decreased urine volume, difficulty urinating, dysuria, flank pain, frequency, hematuria and urgency.   Musculoskeletal:  Negative for arthralgias, back pain, joint swelling and myalgias.   Skin:  Negative for color change and rash.   Neurological:  Negative for dizziness, seizures, facial asymmetry, speech difficulty, numbness and headaches.   Psychiatric/Behavioral:  Negative for agitation, confusion and decreased concentration. The patient is not nervous/anxious.        Historical Information   Past Medical History:   Diagnosis Date    Anxiety     Hypertension      Past Surgical History:   Procedure Laterality Date    NO PAST SURGERIES       Social History     Tobacco Use    Smoking status: Never    Smokeless tobacco: Never   Vaping Use    Vaping status: Never Used   Substance and Sexual Activity    Alcohol use: Not Currently    Drug use: Not Currently    Sexual activity: Yes     E-Cigarette/Vaping    E-Cigarette Use Never User      E-Cigarette/Vaping Substances     History reviewed. No pertinent family history.  Social History:  Marital Status: Single     Patient Pre-hospital Living Situation: Home  Patient Pre-hospital Level of Mobility: walks  Patient Pre-hospital Diet Restrictions: none    Meds/Allergies   I have reviewed home medications with patient personally.  Prior to Admission medications    Medication Sig Start Date End Date Taking? Authorizing Provider   amitriptyline (ELAVIL) 50 mg tablet TAKE 1 TABLET BY MOUTH EVERYDAY AT BEDTIME 6/6/24   ARACELIS Barajas   azithromycin (ZITHROMAX) 250 mg tablet Take 2 tablets today then 1 tablet daily x 4 days 10/19/24 10/24/24  ARACELIS Rosenbaum   busPIRone (BUSPAR) 7.5 mg tablet TAKE 1 TABLET BY MOUTH 2 TIMES A DAY. 9/12/24   ARACELIS Barajas   cefpodoxime (VANTIN) 200 mg tablet  Take 1 tablet (200 mg total) by mouth 2 (two) times a day for 7 days 10/19/24 10/26/24  ARACELIS Rosenbuam   cetirizine (ZyrTEC) 10 mg tablet Take 10 mg by mouth daily    Historical Provider, MD   lisinopril (ZESTRIL) 20 mg tablet TAKE 1 TABLET BY MOUTH EVERY DAY 8/5/24   ARACELIS Barajas   LORazepam (Ativan) 0.5 mg tablet Take one 30 minutes prior to flight may repeat x 1 5/20/22   ARACELIS Barajas   methylPREDNISolone 4 MG tablet therapy pack Use as directed on package 10/10/24   Angy Del Rio PA-C     Allergies   Allergen Reactions    Statins Myalgia       Objective :  Temp:  [98 °F (36.7 °C)-99 °F (37.2 °C)] 99 °F (37.2 °C)  HR:  [104-117] 104  BP: (120-158)/() 138/91  Resp:  [18-20] 18  SpO2:  [92 %-97 %] 97 %  O2 Device: None (Room air)    Physical Exam  Constitutional:       General: He is not in acute distress.     Appearance: He is well-developed. He is not diaphoretic.   HENT:      Head: Normocephalic and atraumatic.      Nose: Nose normal.      Mouth/Throat:      Pharynx: No oropharyngeal exudate.   Eyes:      General: No scleral icterus.     Conjunctiva/sclera: Conjunctivae normal.   Cardiovascular:      Rate and Rhythm: Normal rate and regular rhythm.      Heart sounds: Normal heart sounds. No murmur heard.     No friction rub. No gallop.   Pulmonary:      Effort: Pulmonary effort is normal. No respiratory distress.      Breath sounds: Normal breath sounds. No wheezing or rales.   Chest:      Chest wall: No tenderness.   Abdominal:      General: Bowel sounds are normal. There is no distension.      Palpations: Abdomen is soft.      Tenderness: There is no abdominal tenderness. There is no guarding.   Musculoskeletal:         General: No tenderness or deformity. Normal range of motion.      Cervical back: Normal range of motion and neck supple.   Skin:     General: Skin is warm and dry.      Findings: No erythema.   Neurological:      Mental Status: He is alert. Mental  status is at baseline.          Lines/Drains:            Lab Results: I have reviewed the following results:  Results from last 7 days   Lab Units 10/19/24  1730   WBC Thousand/uL 14.58*   HEMOGLOBIN g/dL 15.5   HEMATOCRIT % 46.9   PLATELETS Thousands/uL 353   SEGS PCT % 78*   LYMPHO PCT % 13*   MONO PCT % 6   EOS PCT % 2     Results from last 7 days   Lab Units 10/19/24  1730   SODIUM mmol/L 138   POTASSIUM mmol/L 3.4*   CHLORIDE mmol/L 105   CO2 mmol/L 22   BUN mg/dL 17   CREATININE mg/dL 0.93   ANION GAP mmol/L 11   CALCIUM mg/dL 9.0   ALBUMIN g/dL 4.0   TOTAL BILIRUBIN mg/dL 0.71   ALK PHOS U/L 156*   ALT U/L 101*   AST U/L 55*   GLUCOSE RANDOM mg/dL 108             Lab Results   Component Value Date    HGBA1C 5.6 12/26/2022    HGBA1C 5.6 12/26/2022    HGBA1C 5.6 09/07/2019     Results from last 7 days   Lab Units 10/19/24  1730   LACTIC ACID mmol/L 1.4   PROCALCITONIN ng/ml 0.09       Work  Other Study Results Review: EKG was reviewed.     Administrative Statements   I have spent a total time of 60 minutes in caring for this patient on the day of the visit/encounter including Prognosis.    ** Please Note: This note has been constructed using a voice recognition system. **

## 2024-10-19 NOTE — ASSESSMENT & PLAN NOTE
49-year-old male presenting with shortness of breath and cough  Was prescribed Vantin  without significant improvement  Reports shortness of breath worse with exertion  WBC count noted to be approximately 15K  We will place on ceftriaxone azithromycin  Currently saturating adequately on room air  Monitor WBC count  Follow-up blood cultures

## 2024-10-19 NOTE — PROGRESS NOTES
"  [facility]    NAME: Robinson Chaudhari is a 49 y.o. male  : 1975    MRN: [id number]  DATE: 2024  TIME: 11:21 AM    Assessment and Plan   Pneumonia of right lower lobe due to infectious organism [J18.9]  1. Pneumonia of right lower lobe due to infectious organism  XR chest pa and lateral        He was already on Augmentin without improvement, suspect potentially less common strain. Will cover with dual coverage.  Noted oxygen 92% it was taken at start of the visit and during the office visit with provider to ensure not dropping, he is not in distress. PSI is 60. Okay to trial outpatient therapy with strict precautions to go to ER with any worsening in symptoms.    Follow up with primary care in 3-5 days.  Go to ER if symptoms get worse.     Patient Instructions     Take all of the antibiotics  If not improving, so straight to the ER. Go there with any increase in shortness of breath, chest pain, dizziness, symptoms.   Follow up with PCP if symptoms are not improving.     Chief Complaint     Chief Complaint   Patient presents with    Shortness of Breath     States cough is getting worse. And can only sleep on one side because he hears \"gurgling\" other wise.          History of Present Illness       Presents with 2 weeks of sick symptoms that began about 10/5/24 with shortness of breath, cough and dizziness. He was seen at urgent care on 10/10 and given Augmentin and steroids. At the start he does have known sick contacts including wife and daughter.  And can only sleep on one side because he hears \"gurgling\" other wise.        Review of Systems   Review of Systems   Constitutional:  Negative for chills and fever.   HENT:  Negative for ear pain and sore throat.    Respiratory:  Positive for cough and shortness of breath.    Cardiovascular:  Negative for chest pain and palpitations.   Gastrointestinal:  Negative for diarrhea, nausea and vomiting.   Musculoskeletal:  Negative for myalgias. "   Neurological:  Positive for light-headedness. Negative for headaches.   All other systems reviewed and are negative.        Current Medications       Current Outpatient Medications:     amitriptyline (ELAVIL) 50 mg tablet, TAKE 1 TABLET BY MOUTH EVERYDAY AT BEDTIME, Disp: 90 tablet, Rfl: 1    busPIRone (BUSPAR) 7.5 mg tablet, TAKE 1 TABLET BY MOUTH 2 TIMES A DAY., Disp: 180 tablet, Rfl: 1    cetirizine (ZyrTEC) 10 mg tablet, Take 10 mg by mouth daily, Disp: , Rfl:     lisinopril (ZESTRIL) 20 mg tablet, TAKE 1 TABLET BY MOUTH EVERY DAY, Disp: 100 tablet, Rfl: 1    LORazepam (Ativan) 0.5 mg tablet, Take one 30 minutes prior to flight may repeat x 1, Disp: 6 tablet, Rfl: 0    methylPREDNISolone 4 MG tablet therapy pack, Use as directed on package, Disp: 1 each, Rfl: 0    Current Allergies     Allergies as of 10/19/2024 - Reviewed 10/19/2024   Allergen Reaction Noted    Statins Myalgia 10/10/2024            The following portions of the patient's history were reviewed and updated as appropriate: allergies, current medications, past family history, past medical history, past social history, past surgical history and problem list.     History reviewed. No pertinent past medical history.    Past Surgical History:   Procedure Laterality Date    NO PAST SURGERIES         History reviewed. No pertinent family history.      Medications have been verified.        Objective   /82   Pulse (!) 117   Temp 98 °F (36.7 °C)   Resp 20   SpO2 92%        Physical Exam     Physical Exam  Vitals reviewed.   Constitutional:       General: He is not in acute distress.     Appearance: Normal appearance.   HENT:      Right Ear: Tympanic membrane, ear canal and external ear normal.      Left Ear: Tympanic membrane, ear canal and external ear normal.      Nose: Nose normal.      Mouth/Throat:      Mouth: Mucous membranes are moist.      Pharynx: No posterior oropharyngeal erythema.   Eyes:      Conjunctiva/sclera: Conjunctivae  normal.   Cardiovascular:      Rate and Rhythm: Normal rate and regular rhythm.      Pulses: Normal pulses.      Heart sounds: Normal heart sounds. No murmur heard.  Pulmonary:      Effort: Pulmonary effort is normal. No respiratory distress.      Breath sounds: Normal breath sounds.   Skin:     General: Skin is warm and dry.   Neurological:      General: No focal deficit present.      Mental Status: He is alert and oriented to person, place, and time.   Psychiatric:         Mood and Affect: Mood normal.         Behavior: Behavior normal.

## 2024-10-20 PROBLEM — R00.0 SINUS TACHYCARDIA: Status: ACTIVE | Noted: 2024-10-20

## 2024-10-20 LAB
ANION GAP SERPL CALCULATED.3IONS-SCNC: 10 MMOL/L (ref 4–13)
ATRIAL RATE: 120 BPM
ATRIAL RATE: 122 BPM
BASOPHILS # BLD AUTO: 0.06 THOUSANDS/ΜL (ref 0–0.1)
BASOPHILS NFR BLD AUTO: 1 % (ref 0–1)
BUN SERPL-MCNC: 13 MG/DL (ref 5–25)
CALCIUM SERPL-MCNC: 8.6 MG/DL (ref 8.4–10.2)
CHLORIDE SERPL-SCNC: 105 MMOL/L (ref 96–108)
CO2 SERPL-SCNC: 23 MMOL/L (ref 21–32)
CREAT SERPL-MCNC: 0.99 MG/DL (ref 0.6–1.3)
EOSINOPHIL # BLD AUTO: 0.31 THOUSAND/ΜL (ref 0–0.61)
EOSINOPHIL NFR BLD AUTO: 3 % (ref 0–6)
ERYTHROCYTE [DISTWIDTH] IN BLOOD BY AUTOMATED COUNT: 12.5 % (ref 11.6–15.1)
FLUAV RNA RESP QL NAA+PROBE: NEGATIVE
FLUBV RNA RESP QL NAA+PROBE: NEGATIVE
GFR SERPL CREATININE-BSD FRML MDRD: 88 ML/MIN/1.73SQ M
GLUCOSE P FAST SERPL-MCNC: 95 MG/DL (ref 65–99)
GLUCOSE SERPL-MCNC: 95 MG/DL (ref 65–140)
HCT VFR BLD AUTO: 45 % (ref 36.5–49.3)
HGB BLD-MCNC: 14.6 G/DL (ref 12–17)
IMM GRANULOCYTES # BLD AUTO: 0.14 THOUSAND/UL (ref 0–0.2)
IMM GRANULOCYTES NFR BLD AUTO: 1 % (ref 0–2)
LYMPHOCYTES # BLD AUTO: 2.14 THOUSANDS/ΜL (ref 0.6–4.47)
LYMPHOCYTES NFR BLD AUTO: 18 % (ref 14–44)
MCH RBC QN AUTO: 28.2 PG (ref 26.8–34.3)
MCHC RBC AUTO-ENTMCNC: 32.4 G/DL (ref 31.4–37.4)
MCV RBC AUTO: 87 FL (ref 82–98)
MONOCYTES # BLD AUTO: 0.88 THOUSAND/ΜL (ref 0.17–1.22)
MONOCYTES NFR BLD AUTO: 8 % (ref 4–12)
NEUTROPHILS # BLD AUTO: 8.18 THOUSANDS/ΜL (ref 1.85–7.62)
NEUTS SEG NFR BLD AUTO: 69 % (ref 43–75)
NRBC BLD AUTO-RTO: 0 /100 WBCS
P AXIS: 49 DEGREES
P AXIS: 53 DEGREES
PLATELET # BLD AUTO: 297 THOUSANDS/UL (ref 149–390)
PMV BLD AUTO: 10.5 FL (ref 8.9–12.7)
POTASSIUM SERPL-SCNC: 4.1 MMOL/L (ref 3.5–5.3)
PR INTERVAL: 128 MS
PR INTERVAL: 134 MS
PROCALCITONIN SERPL-MCNC: 0.07 NG/ML
QRS AXIS: 55 DEGREES
QRS AXIS: 60 DEGREES
QRSD INTERVAL: 70 MS
QRSD INTERVAL: 72 MS
QT INTERVAL: 308 MS
QT INTERVAL: 324 MS
QTC INTERVAL: 438 MS
QTC INTERVAL: 457 MS
RBC # BLD AUTO: 5.18 MILLION/UL (ref 3.88–5.62)
RSV RNA RESP QL NAA+PROBE: NEGATIVE
SARS-COV-2 RNA RESP QL NAA+PROBE: NEGATIVE
SODIUM SERPL-SCNC: 138 MMOL/L (ref 135–147)
T WAVE AXIS: 62 DEGREES
T WAVE AXIS: 71 DEGREES
VENTRICULAR RATE: 120 BPM
VENTRICULAR RATE: 122 BPM
WBC # BLD AUTO: 11.71 THOUSAND/UL (ref 4.31–10.16)

## 2024-10-20 PROCEDURE — 99233 SBSQ HOSP IP/OBS HIGH 50: CPT | Performed by: STUDENT IN AN ORGANIZED HEALTH CARE EDUCATION/TRAINING PROGRAM

## 2024-10-20 PROCEDURE — 84145 PROCALCITONIN (PCT): CPT | Performed by: INTERNAL MEDICINE

## 2024-10-20 PROCEDURE — 0241U HB NFCT DS VIR RESP RNA 4 TRGT: CPT | Performed by: STUDENT IN AN ORGANIZED HEALTH CARE EDUCATION/TRAINING PROGRAM

## 2024-10-20 PROCEDURE — 80048 BASIC METABOLIC PNL TOTAL CA: CPT | Performed by: INTERNAL MEDICINE

## 2024-10-20 PROCEDURE — 93010 ELECTROCARDIOGRAM REPORT: CPT | Performed by: STUDENT IN AN ORGANIZED HEALTH CARE EDUCATION/TRAINING PROGRAM

## 2024-10-20 PROCEDURE — 85025 COMPLETE CBC W/AUTO DIFF WBC: CPT | Performed by: INTERNAL MEDICINE

## 2024-10-20 RX ORDER — METOPROLOL TARTRATE 25 MG/1
25 TABLET, FILM COATED ORAL EVERY 12 HOURS SCHEDULED
Status: DISCONTINUED | OUTPATIENT
Start: 2024-10-20 | End: 2024-10-20

## 2024-10-20 RX ORDER — CEFDINIR 300 MG/1
300 CAPSULE ORAL EVERY 12 HOURS SCHEDULED
Status: DISCONTINUED | OUTPATIENT
Start: 2024-10-20 | End: 2024-10-21 | Stop reason: HOSPADM

## 2024-10-20 RX ORDER — METOPROLOL TARTRATE 25 MG/1
25 TABLET, FILM COATED ORAL EVERY 12 HOURS SCHEDULED
Status: DISCONTINUED | OUTPATIENT
Start: 2024-10-20 | End: 2024-10-21 | Stop reason: HOSPADM

## 2024-10-20 RX ADMIN — CEFDINIR 300 MG: 300 CAPSULE ORAL at 11:38

## 2024-10-20 RX ADMIN — BUSPIRONE HYDROCHLORIDE 7.5 MG: 5 TABLET ORAL at 08:23

## 2024-10-20 RX ADMIN — AMITRIPTYLINE HYDROCHLORIDE 50 MG: 25 TABLET, FILM COATED ORAL at 21:01

## 2024-10-20 RX ADMIN — CEFDINIR 300 MG: 300 CAPSULE ORAL at 21:01

## 2024-10-20 RX ADMIN — BENZONATATE 100 MG: 100 CAPSULE ORAL at 19:40

## 2024-10-20 RX ADMIN — LISINOPRIL 20 MG: 20 TABLET ORAL at 08:23

## 2024-10-20 RX ADMIN — METOPROLOL TARTRATE 25 MG: 25 TABLET, FILM COATED ORAL at 21:01

## 2024-10-20 RX ADMIN — HEPARIN SODIUM 5000 UNITS: 5000 INJECTION INTRAVENOUS; SUBCUTANEOUS at 05:26

## 2024-10-20 RX ADMIN — METOPROLOL TARTRATE 25 MG: 25 TABLET, FILM COATED ORAL at 11:38

## 2024-10-20 RX ADMIN — BENZONATATE 100 MG: 100 CAPSULE ORAL at 14:43

## 2024-10-20 RX ADMIN — BUSPIRONE HYDROCHLORIDE 7.5 MG: 5 TABLET ORAL at 17:03

## 2024-10-20 NOTE — PROGRESS NOTES
Progress Note - Hospitalist   Name: Robinson Chaudhari 49 y.o. male I MRN: [id number]  Unit/Bed#: [facility] I Date of Admission: 10/19/2024   Date of Service: 10/20/2024 I Hospital Day: 0    Assessment & Plan  Pneumonia  49-year-old male presenting with shortness of breath and cough  Patient was given course of Augmentin on 10/10/2024 for 7-day without significant improvement.  Procalcitonin negative x 3.  Chest x-ray report noted with no significant change in patchy right pneumonia  COVID-19/flu/RSV negative.    Patient was given IV ceftriaxone.  Currently he is afebrile, hemodynamically stable.    Leukocytosis improving.  Given his improvement we will transition to p.o. Omnicef to complete 5-day course.  Patient is in agreement with all plan.  Tentative plan for discharge tomorrow.    Generalized anxiety disorder  Continue amitriptyline and BuSpar   Mood stable.  HTN (hypertension)  Blood pressure controlled.  Stable  Discontinue lisinopril and started on Lopressor 25 mg twice daily.  Sinus tachycardia  Patient does report having history of sinus tachycardia.  Reported his data from his phone noted with average heart rate of 130s for last 12 months.  Denies chest pain, palpitation, dizziness, anything comes.  EKG noted with sinus tachycardia.  TSH within normal limits.  Unclear etiology.  Will start on Lopressor 25 mg twice daily and continue telemetry monitoring.  Recommend outpatient follow-up with cardiology upon discharge.  Above has been discussed with patient and his wife at the bedside at length.    VTE Pharmacologic Prophylaxis: VTE Score: 5 Moderate Risk (Score 3-4) - Pharmacological DVT Prophylaxis Ordered: heparin.    Mobility:   Basic Mobility Inpatient Raw Score: 24  JH-HLM Goal: 8: Walk 250 feet or more  JH-HLM Achieved: 8: Walk 250 feet ot more      Patient Centered Rounds: I performed bedside rounds with nursing staff today.     Education and Discussions with Family / Patient: Updated   (wife) at bedside.    Current Length of Stay: 0 day(s)  Current Patient Status: Observation   Certification Statement: The patient will continue to require additional inpatient hospital stay due to transitioning to p.o. antibiotics, sinus tachycardia  Discharge Plan: Anticipate discharge tomorrow to home.    Code Status: Level 1 - Full Code    Subjective   Seen during a.m. rounds.  Patient seen ambulating in his room.  Reports overall he is feeling better.  Denies chest pain, dyspnea, fever, chills, nausea, vomiting, chest pain, palpitation, any other new complaints.  No other events reported.    Objective :  Temp:  [97.8 °F (36.6 °C)-99 °F (37.2 °C)] 97.9 °F (36.6 °C)  HR:  [] 90  BP: (103-158)/() 103/79  Resp:  [18-22] 18  SpO2:  [87 %-97 %] 92 %  O2 Device: None (Room air)    Body mass index is 22.86 kg/m².     Input and Output Summary (last 24 hours):   No intake or output data in the 24 hours ending 10/20/24 1640    Physical Exam  Constitutional:       General: He is not in acute distress.     Appearance: He is not toxic-appearing.   HENT:      Head: Normocephalic.   Cardiovascular:      Rate and Rhythm: Regular rhythm. Tachycardia present.      Pulses: Normal pulses.   Pulmonary:      Effort: Pulmonary effort is normal. No respiratory distress.      Breath sounds: Normal breath sounds. No wheezing.      Comments: Noted acute respiratory distress.  O2 saturation 91 to 93% on room air.  Abdominal:      General: Bowel sounds are normal. There is no distension.      Palpations: Abdomen is soft.      Tenderness: There is no abdominal tenderness.   Musculoskeletal:      Right lower leg: No edema.      Left lower leg: No edema.   Neurological:      Mental Status: He is alert and oriented to person, place, and time. Mental status is at baseline.   Psychiatric:         Mood and Affect: Mood normal.         Behavior: Behavior normal.         Lines/Drains:        Telemetry:  Telemetry Orders (From admission,  onward)               24 Hour Telemetry Monitoring  Continuous x 24 Hours (Telem)        Question:  Reason for 24 Hour Telemetry  Answer:  Arrhythmias requiring acute medical intervention / PPM or ICD malfunction                     Telemetry Reviewed: Sinus Tachycardia  Indication for Continued Telemetry Use: Arrthymias requiring medical therapy               Lab Results: I have reviewed the following results:   Results from last 7 days   Lab Units 10/20/24  0507   WBC Thousand/uL 11.71*   HEMOGLOBIN g/dL 14.6   HEMATOCRIT % 45.0   PLATELETS Thousands/uL 297   SEGS PCT % 69   LYMPHO PCT % 18   MONO PCT % 8   EOS PCT % 3     Results from last 7 days   Lab Units 10/20/24  0507 10/19/24  1730   SODIUM mmol/L 138 138   POTASSIUM mmol/L 4.1 3.4*   CHLORIDE mmol/L 105 105   CO2 mmol/L 23 22   BUN mg/dL 13 17   CREATININE mg/dL 0.99 0.93   ANION GAP mmol/L 10 11   CALCIUM mg/dL 8.6 9.0   ALBUMIN g/dL  --  4.0   TOTAL BILIRUBIN mg/dL  --  0.71   ALK PHOS U/L  --  156*   ALT U/L  --  101*   AST U/L  --  55*   GLUCOSE RANDOM mg/dL 95 108     Results from last 7 days   Lab Units 10/19/24  1730   INR  0.89     Results from last 7 days   Lab Units 10/19/24  2035   POC GLUCOSE mg/dl 156*         Results from last 7 days   Lab Units 10/20/24  0507 10/19/24  2025 10/19/24  1730   LACTIC ACID mmol/L  --   --  1.4   PROCALCITONIN ng/ml 0.07 0.09 0.09       Recent Cultures (last 7 days):   Results from last 7 days   Lab Units 10/19/24  1739 10/19/24  1730   BLOOD CULTURE  Received in Microbiology Lab. Culture in Progress. Received in Microbiology Lab. Culture in Progress.       Imaging Results Review: I personally reviewed the following image studies in PACS and associated radiology reports: chest xray. My interpretation of the radiology images/reports is: Right patchy pneumonia noted.  Other Study Results Review: EKG was personally reviewed and my interpretation is: Sinus tachycardia..    Last 24 Hours Medication List:     Current  Facility-Administered Medications:     acetaminophen (TYLENOL) tablet 650 mg, Q6H PRN    amitriptyline (ELAVIL) tablet 50 mg, HS    benzonatate (TESSALON PERLES) capsule 100 mg, TID PRN    busPIRone (BUSPAR) tablet 7.5 mg, BID    cefdinir (OMNICEF) capsule 300 mg, Q12H REJI    heparin (porcine) subcutaneous injection 5,000 Units, Q8H REJI    metoprolol tartrate (LOPRESSOR) tablet 25 mg, Q12H REJI    Administrative Statements   Today, Patient Was Seen By: Darrion Siu MD  I have spent a total time of 50 minutes in caring for this patient on the day of the visit/encounter including Diagnostic results, Patient and family education, Impressions, Counseling / Coordination of care, Documenting in the medical record, Reviewing / ordering tests, medicine, procedures  , and Obtaining or reviewing history  .    **Please Note: This note may have been constructed using a voice recognition system.**

## 2024-10-20 NOTE — ASSESSMENT & PLAN NOTE
Patient does report having history of sinus tachycardia.  Reported his data from his phone noted with average heart rate of 130s for last 12 months.  Denies chest pain, palpitation, dizziness, anything comes.  EKG noted with sinus tachycardia.  TSH within normal limits.  Unclear etiology.  Will start on Lopressor 25 mg twice daily and continue telemetry monitoring.  Recommend outpatient follow-up with cardiology upon discharge.  Above has been discussed with patient and his wife at the bedside at length.

## 2024-10-20 NOTE — PLAN OF CARE
Problem: RESPIRATORY - ADULT  Goal: Achieves optimal ventilation and oxygenation  Description: INTERVENTIONS:  - Assess for changes in respiratory status  - Assess for changes in mentation and behavior  - Position to facilitate oxygenation and minimize respiratory effort  - Oxygen administered by appropriate delivery if ordered  - Initiate smoking cessation education as indicated  - Encourage broncho-pulmonary hygiene including cough, deep breathe, Incentive Spirometry  - Assess the need for suctioning and aspirate as needed  - Assess and instruct to report SOB or any respiratory difficulty  - Respiratory Therapy support as indicated  Outcome: Progressing     Problem: INFECTION - ADULT  Goal: Absence or prevention of progression during hospitalization  Description: INTERVENTIONS:  - Assess and monitor for signs and symptoms of infection  - Monitor lab/diagnostic results  - Monitor all insertion sites, i.e. indwelling lines, tubes, and drains  - Monitor endotracheal if appropriate and nasal secretions for changes in amount and color  - [facility] appropriate cooling/warming therapies per order  - Administer medications as ordered  - Instruct and encourage patient and family to use good hand hygiene technique  - Identify and instruct in appropriate isolation precautions for identified infection/condition  Outcome: Progressing

## 2024-10-20 NOTE — UTILIZATION REVIEW
"Initial Clinical Review    Admission: Date/Time/Statement:   Admission Orders (From admission, onward)       Ordered        10/19/24 1903  Place in Observation  Once                          Orders Placed This Encounter   Procedures    Place in Observation     Standing Status:   Standing     Number of Occurrences:   1     Order Specific Question:   Level of Care     Answer:   Med Surg [16]     ED Arrival Information       Expected   10/19/2024     Arrival   10/19/2024 16:57    Acuity   Urgent              Means of arrival   Walk-In    Escorted by   Family Member    Service   Hospitalist    Admission type   Emergency              Arrival complaint   Medical Problem             Chief Complaint   Patient presents with    Shortness of Breath     Patient reports \"he was seen at the urgent care this morning and was diagnosed with right sided pneumonia, stated O2 at home was 85% on RA, c/o of increasing sob\"       Initial Presentation: 49 y.o. male to ED via walk-in from home  Present to ED with shortness of breath and cough. Endorses shortness of breath worse with exertion. Patient reports was taking oral antibiotics with Vantin without significant improvement thus presented to the emergency department.   PMHX: anxiety, hypertension   Admitted to OBS with DX: Pneumonia   on exam: tachy; hypertensive; WBC 14.58; K 3.4  CXR patchy right pneumonia.   PLAN: cont iv abx; monitor labs; f/u blood cx       Anticipated Length of Stay/Certification Statement: Patient will be admitted on an observation basis with an anticipated length of stay of less than 2 midnights secondary to pneumonia.         ED Treatment-Medication Administration from 10/19/2024 1656 to 10/19/2024 1949         Date/Time Order Dose Route Action     10/19/2024 1818 albuterol inhalation solution 5 mg 5 mg Nebulization Given     10/19/2024 1818 ipratropium (ATROVENT) 0.02 % inhalation solution 0.5 mg 0.5 mg Nebulization Given     10/19/2024 1857 ceftriaxone " (ROCEPHIN) 1 g/50 mL in dextrose IVPB 1,000 mg Intravenous New Bag            Scheduled Medications:  amitriptyline, 50 mg, Oral, HS  azithromycin, 500 mg, Intravenous, Q24H  busPIRone, 7.5 mg, Oral, BID  cefTRIAXone, 1,000 mg, Intravenous, Q24H  heparin (porcine), 5,000 Units, Subcutaneous, Q8H REJI  lisinopril, 20 mg, Oral, Daily      Continuous IV Infusions: None       PRN Meds:  acetaminophen, 650 mg, Oral, Q6H PRN  benzonatate, 100 mg, Oral, TID PRN      ED Triage Vitals   Temperature Pulse Respirations Blood Pressure SpO2 Pain Score   10/19/24 1705 10/19/24 1705 10/19/24 1705 10/19/24 1705 10/19/24 1705 10/19/24 2052   99 °F (37.2 °C) (!) 115 18 (!) 158/103 93 % No Pain     Weight (last 2 days)       Date/Time Weight    10/19/24 19:53:18 68.2 (150.35)    10/19/24 1705 70.2 (154.76)            Vital Signs (last 3 days)       Date/Time Temp Pulse Resp BP MAP (mmHg) SpO2 O2 Flow Rate (L/min) O2 Device Pain    10/20/24 0831 -- -- 22 -- -- -- -- -- --    10/20/24 07:40:45 98 °F (36.7 °C) 98 18 120/86 97 94 % -- -- --    10/19/24 23:49:39 97.8 °F (36.6 °C) 104 -- 118/85 96 95 % -- -- --    10/19/24 2052 -- -- -- -- -- -- -- -- No Pain    10/19/24 2000 -- -- -- -- -- 94 % 4 L/min Simple mask --    10/19/24 19:53:18 98 °F (36.7 °C) 121 18 141/93 109 94 % -- -- --    10/19/24 19:51:11 98 °F (36.7 °C) 116 -- 141/93 109 94 % -- -- --    10/19/24 1900 -- 115 18 134/78 100 97 % -- -- --    10/19/24 1830 -- 104 18 138/91 107 97 % -- -- --    10/19/24 1705 99 °F (37.2 °C) 115 18 158/103 -- 93 % -- None (Room air) --              Pertinent Labs/Diagnostic Test Results:   Radiology:  XR chest 2 views   Final Interpretation by Vero Luther MD (10/19 2038)      No significant change in patchy right pneumonia.               Workstation performed: [id number]              Results from last 7 days   Lab Units 10/20/24  0507 10/19/24  1730   WBC Thousand/uL 11.71* 14.58*   HEMOGLOBIN g/dL 14.6 15.5   HEMATOCRIT % 45.0 46.9    PLATELETS Thousands/uL 297 353   TOTAL NEUT ABS Thousands/µL 8.18* 11.31*        Results from last 7 days   Lab Units 10/20/24  0507 10/19/24  1730   SODIUM mmol/L 138 138   POTASSIUM mmol/L 4.1 3.4*   CHLORIDE mmol/L 105 105   CO2 mmol/L 23 22   ANION GAP mmol/L 10 11   BUN mg/dL 13 17   CREATININE mg/dL 0.99 0.93   EGFR ml/min/1.73sq m 88 95   CALCIUM mg/dL 8.6 9.0     Results from last 7 days   Lab Units 10/19/24  1730   AST U/L 55*   ALT U/L 101*   ALK PHOS U/L 156*   TOTAL PROTEIN g/dL 7.9   ALBUMIN g/dL 4.0   TOTAL BILIRUBIN mg/dL 0.71     Results from last 7 days   Lab Units 10/19/24  2035   POC GLUCOSE mg/dl 156*     Results from last 7 days   Lab Units 10/20/24  0507 10/19/24  1730   GLUCOSE RANDOM mg/dL 95 108        Results from last 7 days   Lab Units 10/19/24  1730   PROTIME seconds 12.8   INR  0.89   PTT seconds 27        Results from last 7 days   Lab Units 10/20/24  0507 10/19/24  2025 10/19/24  1730   PROCALCITONIN ng/ml 0.07 0.09 0.09     Results from last 7 days   Lab Units 10/19/24  1730   LACTIC ACID mmol/L 1.4        Results from last 7 days   Lab Units 10/19/24  2248   CLARITY UA  Clear   COLOR UA  Yellow   SPEC GRAV UA  1.029   PH UA  5.5   GLUCOSE UA mg/dl Negative   KETONES UA mg/dl 20 (1+)*   BLOOD UA  Negative   PROTEIN UA mg/dl Trace*   NITRITE UA  Negative   BILIRUBIN UA  Negative   UROBILINOGEN UA (BE) mg/dl 2.0*   LEUKOCYTES UA  Negative   WBC UA /hpf 1-2   RBC UA /hpf 1-2   BACTERIA UA /hpf None Seen   EPITHELIAL CELLS WET PREP /hpf None Seen   MUCUS THREADS  Occasional*      Results from last 7 days   Lab Units 10/19/24  1739 10/19/24  1730   BLOOD CULTURE  Received in Microbiology Lab. Culture in Progress. Received in Microbiology Lab. Culture in Progress.          Past Medical History:   Diagnosis Date    Anxiety     Hypertension      Present on Admission:   HTN (hypertension)   Generalized anxiety disorder      Admitting Diagnosis: Pneumonia [J18.9]  SOB (shortness of breath)  [R06.02]  Age/Sex: 49 y.o. male    Network Utilization Review Department  ATTENTION: Please call with any questions or concerns to [phone] and carefully listen to the prompts so that you are directed to the right person. All voicemails are confidential.   For Discharge needs, contact Care Management DC Support Team at [phone] opt. [phone]  Send all requests for admission clinical reviews, approved or denied determinations and any other requests to dedicated fax number below belonging to the campus where the patient is receiving treatment. List of dedicated fax numbers for the Facilities:  FACILITY NAME UR FAX NUMBER   ADMISSION DENIALS (Administrative/Medical Necessity) [phone]   DISCHARGE SUPPORT TEAM (NETWORK) [phone]   PARENT CHILD HEALTH (Maternity/NICU/Pediatrics) [phone]   [facility] [phone]   [facility] [phone]   [facility] [phone]   [facility] [phone]   [facility] [phone]   [facility] [phone]   [facility] [phone]   [facility] [phone]   [facility] [phone]   [facility] [phone]   [facility] [phone]   [facility] [phone]

## 2024-10-20 NOTE — ASSESSMENT & PLAN NOTE
Blood pressure controlled.  Stable  Discontinue lisinopril and started on Lopressor 25 mg twice daily.

## 2024-10-20 NOTE — ASSESSMENT & PLAN NOTE
49-year-old male presenting with shortness of breath and cough  Patient was given course of Augmentin on 10/10/2024 for 7-day without significant improvement.  Procalcitonin negative x 3.  Chest x-ray report noted with no significant change in patchy right pneumonia  COVID-19/flu/RSV negative.    Patient was given IV ceftriaxone.  Currently he is afebrile, hemodynamically stable.    Leukocytosis improving.  Given his improvement we will transition to p.o. Omnicef to complete 5-day course.  Patient is in agreement with all plan.  Tentative plan for discharge tomorrow.

## 2024-10-21 ENCOUNTER — TRANSITIONAL CARE MANAGEMENT (OUTPATIENT)
Dept: FAMILY MEDICINE CLINIC | Facility: CLINIC | Age: 49
End: 2024-10-21

## 2024-10-21 ENCOUNTER — TELEPHONE (OUTPATIENT)
Dept: FAMILY MEDICINE CLINIC | Facility: CLINIC | Age: 49
End: 2024-10-21

## 2024-10-21 VITALS
OXYGEN SATURATION: 93 % | TEMPERATURE: 97.3 F | DIASTOLIC BLOOD PRESSURE: 94 MMHG | HEART RATE: 96 BPM | RESPIRATION RATE: 18 BRPM | HEIGHT: 68 IN | WEIGHT: 150.35 LBS | BODY MASS INDEX: 22.79 KG/M2 | SYSTOLIC BLOOD PRESSURE: 144 MMHG

## 2024-10-21 LAB
ERYTHROCYTE [DISTWIDTH] IN BLOOD BY AUTOMATED COUNT: 12.5 % (ref 11.6–15.1)
HCT VFR BLD AUTO: 42.4 % (ref 36.5–49.3)
HGB BLD-MCNC: 13.9 G/DL (ref 12–17)
MCH RBC QN AUTO: 27.7 PG (ref 26.8–34.3)
MCHC RBC AUTO-ENTMCNC: 32.8 G/DL (ref 31.4–37.4)
MCV RBC AUTO: 85 FL (ref 82–98)
PLATELET # BLD AUTO: 314 THOUSANDS/UL (ref 149–390)
PMV BLD AUTO: 10.5 FL (ref 8.9–12.7)
RBC # BLD AUTO: 5.01 MILLION/UL (ref 3.88–5.62)
WBC # BLD AUTO: 10.21 THOUSAND/UL (ref 4.31–10.16)

## 2024-10-21 PROCEDURE — 85027 COMPLETE CBC AUTOMATED: CPT | Performed by: STUDENT IN AN ORGANIZED HEALTH CARE EDUCATION/TRAINING PROGRAM

## 2024-10-21 PROCEDURE — 99285 EMERGENCY DEPT VISIT HI MDM: CPT | Performed by: EMERGENCY MEDICINE

## 2024-10-21 PROCEDURE — 99239 HOSP IP/OBS DSCHRG MGMT >30: CPT | Performed by: STUDENT IN AN ORGANIZED HEALTH CARE EDUCATION/TRAINING PROGRAM

## 2024-10-21 RX ORDER — BENZONATATE 100 MG/1
100 CAPSULE ORAL 3 TIMES DAILY PRN
Qty: 20 CAPSULE | Refills: 0 | Status: SHIPPED | OUTPATIENT
Start: 2024-10-21

## 2024-10-21 RX ORDER — CEFDINIR 300 MG/1
300 CAPSULE ORAL EVERY 12 HOURS SCHEDULED
Qty: 4 CAPSULE | Refills: 0 | Status: SHIPPED | OUTPATIENT
Start: 2024-10-21 | End: 2024-10-23

## 2024-10-21 RX ORDER — METOPROLOL TARTRATE 25 MG/1
25 TABLET, FILM COATED ORAL EVERY 12 HOURS SCHEDULED
Qty: 60 TABLET | Refills: 0 | Status: SHIPPED | OUTPATIENT
Start: 2024-10-21

## 2024-10-21 RX ADMIN — CEFDINIR 300 MG: 300 CAPSULE ORAL at 08:10

## 2024-10-21 RX ADMIN — BUSPIRONE HYDROCHLORIDE 7.5 MG: 5 TABLET ORAL at 08:10

## 2024-10-21 RX ADMIN — BENZONATATE 100 MG: 100 CAPSULE ORAL at 11:47

## 2024-10-21 RX ADMIN — METOPROLOL TARTRATE 25 MG: 25 TABLET, FILM COATED ORAL at 08:10

## 2024-10-21 NOTE — ASSESSMENT & PLAN NOTE
Blood pressure controlled.  Patient is on lisinopril 20 mg daily  Discontinue lisinopril and started on Lopressor 25 mg twice daily.

## 2024-10-21 NOTE — ASSESSMENT & PLAN NOTE
49-year-old male presenting with shortness of breath and cough  Patient was given course of Augmentin on 10/10/2024 for 7-day without significant improvement.  Procalcitonin negative x 3.  Chest x-ray report noted with no significant change in patchy right pneumonia  COVID-19/flu/RSV negative.  patient was given IV ceftriaxone.      Currently he is afebrile, hemodynamically stable.    Leukocytosis improving.  Given his improvement we will transition to p.o. Omnicef to complete 5-day course.  Patient is in agreement with all plan.  Currently resolution 93-95 percent on room air on ambulation.  Currently patient reports feeling overall much better and eager to go home.  Repeat chest x-ray in 6 to 8 weeks with primary care provider to monitor for resolution.  Recommend follow-up with pulmonology and consider CT chest since symptoms ongoing since early October.  Currently he is hemodynamically stable for discharge.

## 2024-10-21 NOTE — DISCHARGE SUMMARY
Discharge Summary - Hospitalist   Name: Robinson Chaudhari 49 y.o. male I MRN: [id number]  Unit/Bed#: -01 I Date of Admission: 10/19/2024   Date of Service: 10/21/2024 I Hospital Day: 0     Assessment & Plan  Pneumonia  49-year-old male presenting with shortness of breath and cough  Patient was given course of Augmentin on 10/10/2024 for 7-day without significant improvement.  Procalcitonin negative x 3.  Chest x-ray report noted with no significant change in patchy right pneumonia  COVID-19/flu/RSV negative.  patient was given IV ceftriaxone.      Currently he is afebrile, hemodynamically stable.    Leukocytosis improving.  Given his improvement we will transition to p.o. Omnicef to complete 5-day course.  Patient is in agreement with all plan.  Currently resolution 93-95 percent on room air on ambulation.  Currently patient reports feeling overall much better and eager to go home.  Repeat chest x-ray in 6 to 8 weeks with primary care provider to monitor for resolution.  Recommend follow-up with pulmonology and consider CT chest since symptoms ongoing since early October.  Currently he is hemodynamically stable for discharge.      Generalized anxiety disorder  Continue amitriptyline and BuSpar   Mood stable.  HTN (hypertension)  Blood pressure controlled.  Patient is on lisinopril 20 mg daily  Discontinue lisinopril and started on Lopressor 25 mg twice daily.  Sinus tachycardia  Currently in normal sinus rhythm after starting on Lopressor 25 mg daily.  Currently heart rate 78  Blood pressure stable 117/81.  Recommend outpatient follow-up with cardiology for event monitor  Patient and wife both are in agreement.     Medical Problems       Resolved Problems  Date Reviewed: 3/15/2024   None       Discharging Physician / Practitioner: Darrion Siu MD  PCP: ARACELIS Barajas  Admission Date:   Admission Orders (From admission, onward)       Ordered        10/19/24 1903  Place in Observation  Once                           Discharge Date: 10/21/24    Outpatient Tests Requested:  Recommend a repeat chest x-ray in 6 to 8 weeks with primary care provider also considered CT chest with pulmonology        Reason for Admission: Community-acquired pneumonia, sinus tachycardia    Hospital Course:     Robinson Chaudhari is a 49 y.o. male patient with past medical history of anxiety, hypertension, sinus tachycardia, who originally presented to the hospital on 10/19/2024 due to complaining of cough, shortness of breath and diagnosed with [facility] pneumonia.  Patient has been having upper respiratory symptoms since early October and was treated with antibiotics on outpatient settings without significant improvement thus presented to emergency room.  Chest x-ray pulmonary with right patchy pneumonia.  Did well with IV ceftriaxone.  Leukocytosis improved from 14 to 10. Procalcitonin negative x 3.  Since patient improved clinically with improving leukocytosis on antibiotics plan is to discharge on Omnicef for 5 days course.  Recommend outpatient follow-up with PCP to have repeat chest x-ray in 6 to 8 weeks to monitor evaluation as well as given referral for pulmonology and consider CT chest since symptoms ongoing since early October.  Patient was also noted to have sinus tachycardia which appears to be chronic.  Discontinue home dose of lisinopril and started on Lopressor 25 mg twice daily and currently rate controlled with blood pressure stable.  Recommend outpatient follow-up with cardiology for possible Holter/event monitor which patient and wife both are in agreement. currently he is afebrile and hemodynamically stable.  Seen ambulating in his room and his O2 saturation remained around 93 to 95% on room air.  Patient also reports overall feeling much better and eager to go home.  Denies any other new complaints at this time.  Refer to earlier notes for further clarification.        Please see above list of diagnoses and related  "plan for additional information.     Condition at Discharge: good    Discharge Day Visit / Exam:   Subjective: Appears comfortable not in distress.  Seen ambulating in his room without any new complaints.  Reports cough and all his symptoms are significantly improved.  O2 saturation remained around 93 to 95% on room air.  Heart rate improved after started on Lopressor.  Currently denies any new complaints and eager to go home.      Vitals: Blood Pressure: 144/94 (10/21/24 0725)  Pulse: 96 (10/21/24 0725)  Temperature: (!) 97.3 °F (36.3 °C) (10/21/24 0725)  Temp Source: Oral (10/19/24 1953)  Respirations: 18 (10/20/24 1442)  Height: 5' 8\" (172.7 cm) (10/19/24 1953)  Weight - Scale: 68.2 kg (150 lb 5.7 oz) (10/19/24 1953)  SpO2: 93 % (10/21/24 0725)  Physical Exam  Constitutional:       General: He is not in acute distress.     Appearance: He is not toxic-appearing.   HENT:      Head: Normocephalic.   Cardiovascular:      Rate and Rhythm: Normal rate and regular rhythm.      Pulses: Normal pulses.   Pulmonary:      Effort: Pulmonary effort is normal. No respiratory distress.      Breath sounds: Normal breath sounds. No wheezing.      Comments: Noted acute respiratory distress.  O2 saturation 93-95% on RA.  Abdominal:      General: Bowel sounds are normal. There is no distension.      Palpations: Abdomen is soft.      Tenderness: There is no abdominal tenderness.   Musculoskeletal:      Right lower leg: No edema.      Left lower leg: No edema.   Neurological:      Mental Status: He is alert and oriented to person, place, and time. Mental status is at baseline.   Psychiatric:         Mood and Affect: Mood normal.         Behavior: Behavior normal.          Discussion with Family: Updated  (wife) at bedside.    Discharge instructions/Information to patient and family:   See after visit summary for information provided to patient and family.      Provisions for Follow-Up Care:  See after visit summary for " information related to follow-up care and any pertinent home health orders.      Mobility at time of Discharge:   Basic Mobility Inpatient Raw Score: 24  JH-HLM Goal: 8: Walk 250 feet or more  JH-HLM Achieved: 8: Walk 250 feet ot more       Disposition:   Home    Planned Readmission:     Discharge Medications:  See after visit summary for reconciled discharge medications provided to patient and/or family.      Administrative Statements   Discharge Statement:  I have spent a total time of 35 minutes in caring for this patient on the day of the visit/encounter. >30 minutes of time was spent on: Risks and benefits of tx options, Instructions for management, Documenting in the medical record, and Reviewing / ordering tests, medicine, procedures  .    **Please Note: This note may have been constructed using a voice recognition system**

## 2024-10-21 NOTE — PLAN OF CARE
Problem: INFECTION - ADULT  Goal: Absence or prevention of progression during hospitalization  Description: INTERVENTIONS:  - Assess and monitor for signs and symptoms of infection  - Monitor lab/diagnostic results  - Monitor all insertion sites, i.e. indwelling lines, tubes, and drains  - Monitor endotracheal if appropriate and nasal secretions for changes in amount and color  - [place] appropriate cooling/warming therapies per order  - Administer medications as ordered  - Instruct and encourage patient and family to use good hand hygiene technique  - Identify and instruct in appropriate isolation precautions for identified infection/condition  Outcome: Progressing

## 2024-10-21 NOTE — DISCHARGE INSTR - AVS FIRST PAGE
Recommend close follow-up with primary care provider within 1 week of discharge.  Recommend outpatient follow-up with cardiology for event monitor.  Recommend outpatient follow-up with pulmonology to consider CT chest on outpatient basis since symptoms ongoing since October.  Recommend outpatient follow-up with primary care provider to have repeat chest x-ray in 6-8 months to monitor resolution.  Recommended to discontinue lisinopril.  Started on Lopressor 25 mg twice daily and recommend outpatient follow-up with PCP, cardiology for prescription refills.

## 2024-10-21 NOTE — ASSESSMENT & PLAN NOTE
Currently in normal sinus rhythm after starting on Lopressor 25 mg daily.  Currently heart rate 78  Blood pressure stable 117/81.  Recommend outpatient follow-up with cardiology for event monitor  Patient and wife both are in agreement.

## 2024-10-21 NOTE — TELEPHONE ENCOUNTER
----- Message from ARACELIS Villarreal sent at 10/21/2024 11:56 AM EDT -----  Regarding: FW: Hospital discharge  Schedule for TCM  ----- Message -----  From: Darrion LEMOS MD  Sent: 10/21/2024  11:25 AM EDT  To: ARACELIS Barajas  Subject: Hospital discharge                                   Thank you for allowing us to participate in the care of your patient, Robinson Chaudhari, who was hospitalized from 10/19/2024 through 10/21/2024 with the admitting diagnosis of community-acquired pneumonia.  Currently saturating well and feeling better.  Being discharged on Omnicef for 5-day course.  Recommend repeat chest x-ray in 6 to 8 weeks to monitor resolution.  Also recommended outpatient follow-up with pulmonology since ongoing symptoms since early October.  Consider outpatient CT chest.  Patient also noted to have sinus tachycardia and started on Lopressor and lisinopril discontinued with recommendation to follow-up with cardiology for event monitor.    Medication Changes:  Lisinopril discontinued.  Started on Lopressor 25 mg twice daily    Outpatient testing recommended:  Recommended chest x-ray in 6 to 8 weeks or consider outpatient CT chest.    If you have any additional questions or would like to discuss further, please feel free to contact me.    Darrion Siu MD  [facility], Hospitalist  [phone]

## 2024-10-22 ENCOUNTER — TELEPHONE (OUTPATIENT)
Age: 49
End: 2024-10-22

## 2024-10-22 NOTE — TELEPHONE ENCOUNTER
Received call from pt's liane.  Pt was recently discharged from the hospital with PNA and just started on Beta Blockers.  They are asking for a recommendation on for cough medicine/cough suppressant for him that doesn't interact with his Metoprolol.  The Tessalon pearls are not helping. They can be contact via My Chart.  Please review and advise.

## 2024-10-23 ENCOUNTER — CONSULT (OUTPATIENT)
Dept: PULMONOLOGY | Facility: CLINIC | Age: 49
End: 2024-10-23
Payer: COMMERCIAL

## 2024-10-23 ENCOUNTER — TELEPHONE (OUTPATIENT)
Age: 49
End: 2024-10-23

## 2024-10-23 VITALS
HEART RATE: 90 BPM | WEIGHT: 154 LBS | BODY MASS INDEX: 23.34 KG/M2 | SYSTOLIC BLOOD PRESSURE: 120 MMHG | TEMPERATURE: 97.6 F | OXYGEN SATURATION: 93 % | HEIGHT: 68 IN | DIASTOLIC BLOOD PRESSURE: 82 MMHG

## 2024-10-23 DIAGNOSIS — J18.9 PNEUMONIA: Primary | ICD-10-CM

## 2024-10-23 DIAGNOSIS — R05.9 COUGH: ICD-10-CM

## 2024-10-23 DIAGNOSIS — I10 PRIMARY HYPERTENSION: ICD-10-CM

## 2024-10-23 PROCEDURE — 99244 OFF/OP CNSLTJ NEW/EST MOD 40: CPT | Performed by: INTERNAL MEDICINE

## 2024-10-23 RX ORDER — ALBUTEROL SULFATE 90 UG/1
2 INHALANT RESPIRATORY (INHALATION) EVERY 4 HOURS PRN
Qty: 18 G | Refills: 1 | Status: SHIPPED | OUTPATIENT
Start: 2024-10-23

## 2024-10-23 NOTE — ED PROVIDER NOTES
Time reflects when diagnosis was documented in both MDM as applicable and the Disposition within this note       Time User Action Codes Description Comment    10/19/2024  7:10 PM Cheo Dorsey Add [J18.9] Pneumonia     10/21/2024 11:22 AM Darrion Siu Add [R05.9] Cough     10/21/2024 11:22 AM Darrion Siu Add [R00.0] Sinus tachycardia           ED Disposition       None          Assessment & Plan       Medical Decision Making  49-year-old male presenting to the emergency department for evaluation of shortness of breath.  Patient on treatment for pneumonia, patient is having worsening symptoms despite this, he is noted to be tachycardic at rest here, we will evaluate for worsening infection, continue to monitor respirations, titrate oxygen to sat as needed, anticipate admission given failure of outpatient therapy for pneumonia.    Amount and/or Complexity of Data Reviewed  Labs: ordered.    Risk  Prescription drug management.             Medications   albuterol inhalation solution 5 mg (5 mg Nebulization Given 10/19/24 1818)   ipratropium (ATROVENT) 0.02 % inhalation solution 0.5 mg (0.5 mg Nebulization Given 10/19/24 1818)   ceftriaxone (ROCEPHIN) 1 g/50 mL in dextrose IVPB (1,000 mg Intravenous New Bag 10/19/24 1857)       ED Risk Strat Scores                           SBIRT 20yo+      Flowsheet Row Most Recent Value   Initial Alcohol Screen: US AUDIT-C     1. How often do you have a drink containing alcohol? 0 Filed at: 10/19/2024 1711   2. How many drinks containing alcohol do you have on a typical day you are drinking?  0 Filed at: 10/19/2024 1711   3a. Male UNDER 65: How often do you have five or more drinks on one occasion? 0 Filed at: 10/19/2024 1711   Audit-C Score 0 Filed at: 10/19/2024 1711   TRINY: How many times in the past year have you...    Used an illegal drug or used a prescription medication for non-medical reasons? Never Filed at: 10/19/2024 1711                            History of Present  "Illness       Chief Complaint   Patient presents with    Shortness of Breath     Patient reports \"he was seen at the urgent care this morning and was diagnosed with right sided pneumonia, stated O2 at home was 85% on RA, c/o of increasing sob\"       Past Medical History:   Diagnosis Date    Anxiety     Hypertension       Past Surgical History:   Procedure Laterality Date    NO PAST SURGERIES        History reviewed. No pertinent family history.   Social History     Tobacco Use    Smoking status: Former     Current packs/day: 0.00     Types: Cigarettes     Quit date: 2/19/2014     Years since quitting: 10.6    Smokeless tobacco: Former   Vaping Use    Vaping status: Never Used   Substance Use Topics    Alcohol use: Not Currently    Drug use: Not Currently      E-Cigarette/Vaping    E-Cigarette Use Never User       E-Cigarette/Vaping Substances    Nicotine No     THC No     CBD No     Flavoring No     Other No     Unknown No       I have reviewed and agree with the history as documented.     49-year-old male presented to the emergency department for evaluation shortness of breath.  Patient states that he was recently diagnosed with pneumonia, started antibiotics, was feeling worse today, checked his oxygen at home and it was low, 85% on room air.  He therefore presented to the emergency department.  He has had no fevers or chills.  He reports no productive cough at this time.  Presently at rest he is symptom-free.        Review of Systems   Constitutional:  Negative for appetite change, chills, fatigue and fever.   HENT:  Negative for sneezing and sore throat.    Eyes:  Negative for visual disturbance.   Respiratory:  Positive for cough and shortness of breath. Negative for choking, chest tightness and wheezing.    Cardiovascular:  Negative for chest pain and palpitations.   Gastrointestinal:  Negative for abdominal pain, constipation, diarrhea, nausea and vomiting.   Genitourinary:  Negative for difficulty urinating " and dysuria.   Neurological:  Negative for dizziness, weakness, light-headedness, numbness and headaches.   All other systems reviewed and are negative.          Objective       ED Triage Vitals   Temperature Pulse Blood Pressure Respirations SpO2 Patient Position - Orthostatic VS   10/19/24 1705 10/19/24 1705 10/19/24 1705 10/19/24 1705 10/19/24 1705 --   99 °F (37.2 °C) (!) 115 (!) 158/103 18 93 %       Temp Source Heart Rate Source BP Location FiO2 (%) Pain Score    10/19/24 1705 10/19/24 1705 10/19/24 1705 -- 10/19/24 2052    Oral Monitor Right arm  No Pain      Vitals      Date and Time Temp Pulse SpO2 Resp BP Pain Score FACES Pain Rating User   10/21/24 0900 -- -- -- -- -- No Pain -- PM   10/21/24 0725 97.3 °F (36.3 °C) 96 93 % -- 144/94 -- -- DII   10/21/24 0611 97.9 °F (36.6 °C) 92 91 % -- 115/83 -- -- DII   10/21/24 0337 98 °F (36.7 °C) 78 90 % -- 117/81 -- -- DII   10/20/24 2219 98.3 °F (36.8 °C) -- -- -- 123/82 -- -- DII   10/20/24 1901 -- -- -- -- -- No Pain -- CR   10/20/24 1900 98.3 °F (36.8 °C) 96 89 % -- 112/74 -- -- DII   10/20/24 1442 97.9 °F (36.6 °C) 90 92 % 18 103/79 -- -- DII   10/20/24 1434 -- 82 94 % -- -- -- -- NK   10/20/24 1431 -- 86 87 % -- -- -- -- NK   10/20/24 1429 -- -- 88 % -- -- -- -- NK   10/20/24 1428 -- -- 89 % -- -- -- -- NK   10/20/24 1058 -- 94 91 % -- 110/81 -- -- DII   10/20/24 0831 -- -- -- 22 -- -- -- JR   10/20/24 0800 -- -- -- -- -- No Pain -- NK   10/20/24 0740 98 °F (36.7 °C) 98 94 % 18 120/86 -- -- DII   10/19/24 2349 97.8 °F (36.6 °C) 104 95 % -- 118/85 -- -- DII   10/19/24 2052 -- -- -- -- -- No Pain -- AS   10/19/24 2000 -- -- 94 % -- -- -- -- AS   10/19/24 1953 -- -- -- 18 -- -- -- AS   10/19/24 1953 98 °F (36.7 °C) 121 94 % -- 141/93 -- -- DII   10/19/24 1951 98 °F (36.7 °C) 116 94 % -- 141/93 -- -- DII   10/19/24 1900 -- 115 97 % 18 134/78 -- -- CC   10/19/24 1830 -- 104 97 % 18 138/91 -- -- CC   10/19/24 1705 99 °F (37.2 °C) 115 93 % 18 158/103 -- -- MI             Physical Exam  Vitals and nursing note reviewed.   Constitutional:       General: He is not in acute distress.     Appearance: He is well-developed. He is not diaphoretic.   HENT:      Head: Normocephalic and atraumatic.   Eyes:      Pupils: Pupils are equal, round, and reactive to light.   Neck:      Vascular: No JVD.      Trachea: No tracheal deviation.   Cardiovascular:      Rate and Rhythm: Normal rate and regular rhythm.      Heart sounds: Normal heart sounds. No murmur heard.     No friction rub. No gallop.   Pulmonary:      Effort: Pulmonary effort is normal. No respiratory distress.      Breath sounds: Rhonchi present. No wheezing or rales.   Abdominal:      General: Bowel sounds are normal. There is no distension.      Palpations: Abdomen is soft.      Tenderness: There is no abdominal tenderness. There is no guarding or rebound.   Skin:     General: Skin is warm and dry.      Coloration: Skin is not pale.   Neurological:      Mental Status: He is alert and oriented to person, place, and time.      Cranial Nerves: No cranial nerve deficit.      Motor: No abnormal muscle tone.   Psychiatric:         Behavior: Behavior normal.         Results Reviewed       Procedure Component Value Units Date/Time    Blood culture #1 [[id number]] Collected: 10/19/24 1739    Lab Status: Preliminary result Specimen: Blood from Arm, Left Updated: 10/23/24 0101     Blood Culture No Growth at 72 hrs.    Blood culture #2 [[id number]] Collected: 10/19/24 1730    Lab Status: Preliminary result Specimen: Blood from Arm, Right Updated: 10/23/24 0001     Blood Culture No Growth at 72 hrs.    Procalcitonin, Next Day AM Collection [[id number]]  (Normal) Collected: 10/20/24 0507    Lab Status: Final result Specimen: Blood from Arm, Left Updated: 10/20/24 0608     Procalcitonin 0.07 ng/ml     Basic metabolic panel, AM Draw, Tomorrow [[id number]] Collected: 10/20/24 0507    Lab Status: Final result Specimen: Blood from Arm, Left Updated:  10/20/24 0601     Sodium 138 mmol/L      Potassium 4.1 mmol/L      Chloride 105 mmol/L      CO2 23 mmol/L      ANION GAP 10 mmol/L      BUN 13 mg/dL      Creatinine 0.99 mg/dL      Glucose 95 mg/dL      Glucose, Fasting 95 mg/dL      Calcium 8.6 mg/dL      eGFR 88 ml/min/1.73sq m     Narrative:      National Kidney Disease Foundation guidelines for Chronic Kidney Disease (CKD):     Stage 1 with normal or high GFR (GFR > 90 mL/min/1.73 square meters)    Stage 2 Mild CKD (GFR = 60-89 mL/min/1.73 square meters)    Stage 3A Moderate CKD (GFR = 45-59 mL/min/1.73 square meters)    Stage 3B Moderate CKD (GFR = 30-44 mL/min/1.73 square meters)    Stage 4 Severe CKD (GFR = 15-29 mL/min/1.73 square meters)    Stage 5 End Stage CKD (GFR <15 mL/min/1.73 square meters)  Note: GFR calculation is accurate only with a steady state creatinine    CBC and differential, AM Draw, Tomorrow [[id number]]  (Abnormal) Collected: 10/20/24 0507    Lab Status: Final result Specimen: Blood from Arm, Left Updated: 10/20/24 0534     WBC 11.71 Thousand/uL      RBC 5.18 Million/uL      Hemoglobin 14.6 g/dL      Hematocrit 45.0 %      MCV 87 fL      MCH 28.2 pg      MCHC 32.4 g/dL      RDW 12.5 %      MPV 10.5 fL      Platelets 297 Thousands/uL      nRBC 0 /100 WBCs      Segmented % 69 %      Immature Grans % 1 %      Lymphocytes % 18 %      Monocytes % 8 %      Eosinophils Relative 3 %      Basophils Relative 1 %      Absolute Neutrophils 8.18 Thousands/µL      Absolute Immature Grans 0.14 Thousand/uL      Absolute Lymphocytes 2.14 Thousands/µL      Absolute Monocytes 0.88 Thousand/µL      Eosinophils Absolute 0.31 Thousand/µL      Basophils Absolute 0.06 Thousands/µL     UA w Reflex to Microscopic w Reflex to Culture [[id number]]  (Abnormal) Collected: 10/19/24 2244    Lab Status: Final result Specimen: Urine, Clean Catch Updated: 10/19/24 2255     Color, UA Yellow     Clarity, UA Clear     Specific Gravity, UA 1.029     pH, UA 5.5     Leukocytes, UA  Negative     Nitrite, UA Negative     Protein, UA Trace mg/dl      Glucose, UA Negative mg/dl      Ketones, UA 20 (1+) mg/dl      Urobilinogen, UA 2.0 mg/dl      Bilirubin, UA Negative     Occult Blood, UA Negative    Protime-INR [[id number]]  (Normal) Collected: 10/19/24 1730    Lab Status: Final result Specimen: Blood from Arm, Right Updated: 10/19/24 2145     Protime 12.8 seconds      INR 0.89    Narrative:      INR Therapeutic Range    Indication                                             INR Range      Atrial Fibrillation                                               2.0-3.0  Hypercoagulable State                                    2.0.2.3  Left Ventricular Asist Device                            2.0-3.0  Mechanical Heart Valve                                  -    Aortic(with afib, MI, embolism, HF, LA enlargement,    and/or coagulopathy)                                     2.0-3.0 (2.5-3.5)     Mitral                                                             2.5-3.5  Prosthetic/Bioprosthetic Heart Valve               2.0-3.0  Venous thromboembolism (VTE: VT, PE        2.0-3.0    APTT [[id number]]  (Normal) Collected: 10/19/24 1730    Lab Status: Final result Specimen: Blood from Arm, Right Updated: 10/19/24 2145     PTT 27 seconds     Procalcitonin [[id number]]  (Normal) Collected: 10/19/24 2025    Lab Status: Final result Specimen: Blood Updated: 10/19/24 2051     Procalcitonin 0.09 ng/ml     CBC and differential [[id number]]  (Abnormal) Collected: 10/19/24 1730    Lab Status: Final result Specimen: Blood from Arm, Right Updated: 10/19/24 1824     WBC 14.58 Thousand/uL      RBC 5.50 Million/uL      Hemoglobin 15.5 g/dL      Hematocrit 46.9 %      MCV 85 fL      MCH 28.2 pg      MCHC 33.0 g/dL      RDW 12.5 %      MPV 10.4 fL      Platelets 353 Thousands/uL      nRBC 0 /100 WBCs      Segmented % 78 %      Immature Grans % 1 %      Lymphocytes % 13 %      Monocytes % 6 %      Eosinophils Relative 2 %       Basophils Relative 0 %      Absolute Neutrophils 11.31 Thousands/µL      Absolute Immature Grans 0.13 Thousand/uL      Absolute Lymphocytes 1.90 Thousands/µL      Absolute Monocytes 0.92 Thousand/µL      Eosinophils Absolute 0.26 Thousand/µL      Basophils Absolute 0.06 Thousands/µL     Procalcitonin [[id number]]  (Normal) Collected: 10/19/24 1730    Lab Status: Final result Specimen: Blood from Arm, Right Updated: 10/19/24 1805     Procalcitonin 0.09 ng/ml     Lactic acid [[id number]]  (Normal) Collected: 10/19/24 1730    Lab Status: Final result Specimen: Blood from Arm, Right Updated: 10/19/24 1804     LACTIC ACID 1.4 mmol/L     Narrative:      Result may be elevated if tourniquet was used during collection.    Comprehensive metabolic panel [[id number]]  (Abnormal) Collected: 10/19/24 1730    Lab Status: Final result Specimen: Blood from Arm, Right Updated: 10/19/24 1755     Sodium 138 mmol/L      Potassium 3.4 mmol/L      Chloride 105 mmol/L      CO2 22 mmol/L      ANION GAP 11 mmol/L      BUN 17 mg/dL      Creatinine 0.93 mg/dL      Glucose 108 mg/dL      Calcium 9.0 mg/dL      AST 55 U/L       U/L      Alkaline Phosphatase 156 U/L      Total Protein 7.9 g/dL      Albumin 4.0 g/dL      Total Bilirubin 0.71 mg/dL      eGFR 95 ml/min/1.73sq m     Narrative:      National Kidney Disease Foundation guidelines for Chronic Kidney Disease (CKD):     Stage 1 with normal or high GFR (GFR > 90 mL/min/1.73 square meters)    Stage 2 Mild CKD (GFR = 60-89 mL/min/1.73 square meters)    Stage 3A Moderate CKD (GFR = 45-59 mL/min/1.73 square meters)    Stage 3B Moderate CKD (GFR = 30-44 mL/min/1.73 square meters)    Stage 4 Severe CKD (GFR = 15-29 mL/min/1.73 square meters)    Stage 5 End Stage CKD (GFR <15 mL/min/1.73 square meters)  Note: GFR calculation is accurate only with a steady state creatinine            XR chest 2 views   Final Interpretation by Vero Luther MD (10/19 2038)      No significant change in  patchy right pneumonia.               Workstation performed: [id number]             Procedures    ED Medication and Procedure Management   Prior to Admission Medications   Prescriptions Last Dose Informant Patient Reported? Taking?   LORazepam (Ativan) 0.5 mg tablet More than a month  No No   Sig: Take one 30 minutes prior to flight may repeat x 1   amitriptyline (ELAVIL) 50 mg tablet 10/18/2024  No Yes   Sig: TAKE 1 TABLET BY MOUTH EVERYDAY AT BEDTIME   azithromycin (ZITHROMAX) 250 mg tablet 10/19/2024  No Yes   Sig: Take 2 tablets today then 1 tablet daily x 4 days   busPIRone (BUSPAR) 7.5 mg tablet 10/18/2024  No Yes   Sig: TAKE 1 TABLET BY MOUTH 2 TIMES A DAY.   cefpodoxime (VANTIN) 200 mg tablet 10/19/2024  No Yes   Sig: Take 1 tablet (200 mg total) by mouth 2 (two) times a day for 7 days   cetirizine (ZyrTEC) 10 mg tablet 10/19/2024  Yes Yes   Sig: Take 10 mg by mouth daily   lisinopril (ZESTRIL) 20 mg tablet 10/19/2024  No Yes   Sig: TAKE 1 TABLET BY MOUTH EVERY DAY   methylPREDNISolone 4 MG tablet therapy pack Past Week  No Yes   Sig: Use as directed on package      Facility-Administered Medications: None     Discharge Medication List as of 10/21/2024 12:35 PM        START taking these medications    Details   benzonatate (TESSALON PERLES) 100 mg capsule Take 1 capsule (100 mg total) by mouth 3 (three) times a day as needed for cough, Starting Mon 10/21/2024, Normal      cefdinir (OMNICEF) 300 mg capsule Take 1 capsule (300 mg total) by mouth every 12 (twelve) hours for 2 days, Starting Mon 10/21/2024, Until Wed 10/23/2024, Normal      metoprolol tartrate (LOPRESSOR) 25 mg tablet Take 1 tablet (25 mg total) by mouth every 12 (twelve) hours, Starting Mon 10/21/2024, Normal           CONTINUE these medications which have NOT CHANGED    Details   amitriptyline (ELAVIL) 50 mg tablet TAKE 1 TABLET BY MOUTH EVERYDAY AT BEDTIME, Starting Thu 6/6/2024, Normal      busPIRone (BUSPAR) 7.5 mg tablet TAKE 1 TABLET BY  MOUTH 2 TIMES A DAY., Starting Thu 9/12/2024, Normal      LORazepam (Ativan) 0.5 mg tablet Take one 30 minutes prior to flight may repeat x 1, Normal           STOP taking these medications       cetirizine (ZyrTEC) 10 mg tablet Comments:   Reason for Stopping:         azithromycin (ZITHROMAX) 250 mg tablet Comments:   Reason for Stopping:         cefpodoxime (VANTIN) 200 mg tablet Comments:   Reason for Stopping:         lisinopril (ZESTRIL) 20 mg tablet Comments:   Reason for Stopping:         methylPREDNISolone 4 MG tablet therapy pack Comments:   Reason for Stopping:             Outpatient Discharge Orders   Ambulatory referral to Cardiology   Standing Status: Future Standing Exp. Date: 10/21/25      Ambulatory Referral to Pulmonology   Standing Status: Future Number of Occurrences: 1 Standing Exp. Date: 10/21/25      Activity as tolerated     Call provider for:  persistent nausea or vomiting     Call provider for:  redness, tenderness, or signs of infection (pain, swelling, redness, odor or green/yellow discharge around incision site)     Call provider for:  difficulty breathing, headache or visual disturbances     Call provider for:  persistent dizziness or light-headedness     ED SEPSIS DOCUMENTATION   Time reflects when diagnosis was documented in both MDM as applicable and the Disposition within this note       Time User Action Codes Description Comment    10/19/2024  7:10 PM Cheo Dorsey [J18.9] Pneumonia     10/21/2024 11:22 AM Darrion Siu [R05.9] Cough     10/21/2024 11:22 AM Darrion Siu [R00.0] Sinus tachycardia                  Cheo Dorsey MD  10/23/24 5915

## 2024-10-23 NOTE — ASSESSMENT & PLAN NOTE
He has history of hypertension and has been on treatment with metoprolol.  No previous history of asthma.

## 2024-10-23 NOTE — PROGRESS NOTES
Ambulatory Visit  Name: Robinson Chaudhari      : 1975      MRN: [id number]  Encounter Provider: Tracee Terry MD  Encounter Date: 10/23/2024   [facility] [place]    Assessment & Plan  Pneumonia  Robinson Chaudhari was recently admitted to hospital with right-sided pneumonia.  He was treated with Augmentin initially followed by ceftriaxone and is now completing a course of  Omnicef.  He continues to have cough though his shortness of breath and wheeze are getting better.  I reviewed his chest x-ray which showed patchy pneumonia on the right side.  On clinical examination, his chest auscultation revealed bilateral coarse inspiratory crackles at lung bases.  I have ordered a CT scan with contrast for further evaluation.  Meanwhile I advised him to continue with benzonatate for symptomatic relief of cough.  I have started him on albuterol inhaler 2 puffs every 4 hourly for any shortness of breath or wheeze.  I had a long discussion with him and answered all his questions.  Orders:    Ambulatory Referral to Pulmonology    CT chest with contrast; Future    albuterol (Ventolin HFA) 90 mcg/act inhaler; Inhale 2 puffs every 4 (four) hours as needed for wheezing or shortness of breath    Cough  He is on symptomatic treatment with benzonatate for cough.  Orders:    Ambulatory Referral to Pulmonology    Primary hypertension  He has history of hypertension and has been on treatment with metoprolol.  No previous history of asthma.         History of Present Illness       Robinson Chaudhari is a 49 y.o. male with past medical history of hypertension and tachycardia and anxiety who recently had cough with greenish phlegm shortness of breath and fever with chills and was admitted to [facility].  He was found to have right-sided pneumonia on chest x-ray and was treated with antibiotics.  Initially received Augmentin and was given ceftriaxone while in the hospital.   "He was discharged home on Omnicef for 5 days which he is about to complete.  Currently he has no fever or chills.  He has cough with phlegm which is getting clear.  He has some shortness of breath on exertion.  He has no previous history of COPD or asthma.  He was a previous smoker smoked for about 10 to 12 years about a pack a day.  He does not consume alcohol other than socially.  No occupational exposure to chemicals or asbestos.  He denied any chest pain or palpitations or swelling of feet.  He denied any abdominal pain nausea or vomiting.  He has dental caries.      Review of Systems   Constitutional:  Positive for fatigue. Negative for appetite change, chills and fever.   HENT:  Positive for rhinorrhea. Negative for hearing loss, sneezing, sore throat and trouble swallowing.    Respiratory:  Positive for cough, shortness of breath and wheezing. Negative for chest tightness.    Cardiovascular:  Negative for chest pain, palpitations and leg swelling.   Gastrointestinal:  Positive for diarrhea. Negative for abdominal pain, constipation, nausea and vomiting.   Genitourinary:  Negative for dysuria, frequency and urgency.   Musculoskeletal:  Positive for arthralgias and back pain. Negative for gait problem.   Skin:  Negative for rash.   Allergic/Immunologic: Positive for environmental allergies.   Neurological:  Positive for light-headedness. Negative for dizziness, seizures, syncope and headaches.   Psychiatric/Behavioral:  Negative for agitation, confusion and sleep disturbance. The patient is nervous/anxious.            Objective     /82 (BP Location: Left arm, Patient Position: Sitting, Cuff Size: Standard)   Pulse 90   Temp 97.6 °F (36.4 °C) (Tympanic)   Ht 5' 8\" (1.727 m)   Wt 69.9 kg (154 lb)   SpO2 93%   BMI 23.42 kg/m²     Physical Exam  Vitals reviewed.   Constitutional:       General: He is not in acute distress.     Appearance: He is not ill-appearing, toxic-appearing or diaphoretic.      " Interventions: He is not intubated.  HENT:      Head: Normocephalic.      Mouth/Throat:      Mouth: Mucous membranes are moist.   Neck:      Vascular: No JVD.   Cardiovascular:      Rate and Rhythm: Normal rate.      Heart sounds: Normal heart sounds. No murmur heard.  Pulmonary:      Effort: Pulmonary effort is normal. No tachypnea, accessory muscle usage or respiratory distress. He is not intubated.      Breath sounds: No stridor. Examination of the right-lower field reveals rales. Examination of the left-lower field reveals rales. Rales (Bilateral basal inspiratory coarse crackles) present. No decreased breath sounds, wheezing or rhonchi.   Chest:      Chest wall: No tenderness.   Abdominal:      General: Bowel sounds are normal.      Palpations: Abdomen is soft.      Tenderness: There is no abdominal tenderness. There is no guarding.   Musculoskeletal:      Cervical back: Neck supple.      Right lower leg: No edema.      Left lower leg: No edema.   Lymphadenopathy:      Cervical: No cervical adenopathy.   Skin:     Coloration: Skin is not cyanotic or pale.      Findings: No rash.      Nails: There is no clubbing.   Neurological:      Mental Status: He is alert and oriented to person, place, and time.      Comments: Stammering   Psychiatric:         Mood and Affect: Mood normal. Mood is not anxious.         Behavior: Behavior normal. Behavior is not agitated.

## 2024-10-23 NOTE — ASSESSMENT & PLAN NOTE
Robinson Chaudhari was recently admitted to hospital with right-sided pneumonia.  He was treated with Augmentin initially followed by ceftriaxone and is now completing a course of  Omnicef.  He continues to have cough though his shortness of breath and wheeze are getting better.  I reviewed his chest x-ray which showed patchy pneumonia on the right side.  On clinical examination, his chest auscultation revealed bilateral coarse inspiratory crackles at lung bases.  I have ordered a CT scan with contrast for further evaluation.  Meanwhile I advised him to continue with benzonatate for symptomatic relief of cough.  I have started him on albuterol inhaler 2 puffs every 4 hourly for any shortness of breath or wheeze.  I had a long discussion with him and answered all his questions.  Orders:    Ambulatory Referral to Pulmonology    CT chest with contrast; Future    albuterol (Ventolin HFA) 90 mcg/act inhaler; Inhale 2 puffs every 4 (four) hours as needed for wheezing or shortness of breath

## 2024-10-24 ENCOUNTER — TELEPHONE (OUTPATIENT)
Age: 49
End: 2024-10-24

## 2024-10-24 NOTE — TELEPHONE ENCOUNTER
PA for ALBUTEROL HFA DENIED    Reason:(Screenshot if applicable)        Message sent to office clinical pool Yes    Denial letter scanned into Media Yes    Appeal started No (Provider will need to decide if appeal is warranted and send clinical documentation to Prior Authorization Team for initiation.)    **Please follow up with your patient regarding denial and next steps**

## 2024-10-24 NOTE — TELEPHONE ENCOUNTER
PA for ALBUTEROL HFA SUBMITTED     via    []CMM-KEY:   [x]Surescripts-Case ID # [id number]   []Availity-Auth ID # NDC #   []Faxed to plan   []Other website   []Phone call Case ID #     Office notes sent, clinical questions answered. Awaiting determination    Turnaround time for your insurance to make a decision on your Prior Authorization can take 7-21 business days.

## 2024-10-25 LAB
BACTERIA BLD CULT: NORMAL
BACTERIA BLD CULT: NORMAL

## 2024-10-29 ENCOUNTER — OFFICE VISIT (OUTPATIENT)
Dept: FAMILY MEDICINE CLINIC | Facility: CLINIC | Age: 49
End: 2024-10-29
Payer: COMMERCIAL

## 2024-10-29 VITALS
OXYGEN SATURATION: 94 % | BODY MASS INDEX: 22.88 KG/M2 | HEIGHT: 68 IN | SYSTOLIC BLOOD PRESSURE: 110 MMHG | HEART RATE: 84 BPM | TEMPERATURE: 96.4 F | WEIGHT: 151 LBS | DIASTOLIC BLOOD PRESSURE: 88 MMHG

## 2024-10-29 DIAGNOSIS — I10 PRIMARY HYPERTENSION: ICD-10-CM

## 2024-10-29 DIAGNOSIS — J18.9 PNEUMONIA DUE TO INFECTIOUS ORGANISM, UNSPECIFIED LATERALITY, UNSPECIFIED PART OF LUNG: Primary | ICD-10-CM

## 2024-10-29 DIAGNOSIS — R00.0 SINUS TACHYCARDIA: ICD-10-CM

## 2024-10-29 DIAGNOSIS — R06.2 WHEEZING: ICD-10-CM

## 2024-10-29 PROCEDURE — 99495 TRANSJ CARE MGMT MOD F2F 14D: CPT | Performed by: NURSE PRACTITIONER

## 2024-10-29 RX ORDER — ALBUTEROL SULFATE 90 UG/1
2 INHALANT RESPIRATORY (INHALATION) EVERY 6 HOURS PRN
Qty: 6.7 G | Refills: 1 | Status: SHIPPED | OUTPATIENT
Start: 2024-10-29 | End: 2024-11-28

## 2024-10-29 NOTE — ASSESSMENT & PLAN NOTE
Currently in normal sinus rhythm after starting on Lopressor 25 mg daily.  Currently heart rate 78  Blood pressure stable 117/81.  Recommend outpatient follow-up with cardiology for event monitor  Will order a 7 day holter monitor

## 2024-10-29 NOTE — ASSESSMENT & PLAN NOTE
49-year-old male presenting with shortness of breath and cough  Patient was given course of Augmentin on 10/10/2024 for 7-day without significant improvement.  Procalcitonin negative x 3.  Chest x-ray report noted with no significant change in patchy right pneumonia  COVID-19/flu/RSV negative.  patient was given IV ceftriaxone.    Orders:    albuterol (Proventil HFA) 90 mcg/act inhaler; Inhale 2 puffs every 6 (six) hours as needed for wheezing or shortness of breath

## 2024-10-29 NOTE — ASSESSMENT & PLAN NOTE
lood pressure controlled.  Patient is on lisinopril 20 mg daily  Discontinue lisinopril and started on Lopressor 25 mg twice daily

## 2024-10-31 ENCOUNTER — HOSPITAL ENCOUNTER (OUTPATIENT)
Dept: CT IMAGING | Facility: CLINIC | Age: 49
Discharge: HOME | End: 2024-10-31
Payer: COMMERCIAL

## 2024-10-31 DIAGNOSIS — J18.9 PNEUMONIA: ICD-10-CM

## 2024-10-31 PROCEDURE — 71260 CT THORAX DX C+: CPT

## 2024-10-31 RX ADMIN — IOHEXOL 35 ML: 350 INJECTION, SOLUTION INTRAVENOUS at 11:03

## 2024-11-01 ENCOUNTER — TELEPHONE (OUTPATIENT)
Dept: PULMONOLOGY | Facility: CLINIC | Age: 49
End: 2024-11-01

## 2024-11-01 NOTE — TELEPHONE ENCOUNTER
Called and spoke with patient re: recent visit with Dr. Terry as a courtesy follow up to see if patient had any questions or concerns. Patient was satisfied with visit and had no concerns at this time.

## 2024-11-05 ENCOUNTER — TRANSITIONAL CARE MANAGEMENT (OUTPATIENT)
Dept: FAMILY MEDICINE CLINIC | Facility: CLINIC | Age: 49
End: 2024-11-05

## 2024-11-05 ENCOUNTER — TELEPHONE (OUTPATIENT)
Age: 49
End: 2024-11-05

## 2024-11-05 NOTE — TELEPHONE ENCOUNTER
Winnie from [place] reading room called to report significant findings in patient's CT chest with contrast completed on 10/31, ordered by Dr. Terry        Please advise.

## 2024-11-12 ENCOUNTER — TELEPHONE (OUTPATIENT)
Dept: FAMILY MEDICINE CLINIC | Facility: CLINIC | Age: 49
End: 2024-11-12

## 2024-11-12 DIAGNOSIS — J18.9 PNEUMONIA OF LEFT LOWER LOBE DUE TO INFECTIOUS ORGANISM: Primary | ICD-10-CM

## 2024-11-12 DIAGNOSIS — R00.0 SINUS TACHYCARDIA: ICD-10-CM

## 2024-11-12 RX ORDER — METOPROLOL TARTRATE 25 MG/1
25 TABLET, FILM COATED ORAL EVERY 12 HOURS SCHEDULED
Qty: 60 TABLET | Refills: 5 | Status: SHIPPED | OUTPATIENT
Start: 2024-11-12

## 2024-11-12 NOTE — TELEPHONE ENCOUNTER
Reason for call:   [x] Refill   [] Prior Auth  [] Other:     Office:   [x] PCP/Provider -   [] Specialty/Provider -     Medication: metoprolol tartrate (LOPRESSOR) 25 mg     Dose/Frequency: Take 1 tablet (25 mg total) by mouth every 12 (twelve) hours     Quantity: 60    Pharmacy: [id number] - [place], [place] - [address]     Does the patient have enough for 3 days?   [] Yes   [x] No - Send as HP to POD

## 2024-11-18 PROBLEM — J18.9 PNEUMONIA: Status: RESOLVED | Noted: 2024-10-19 | Resolved: 2024-11-18

## 2024-11-27 ENCOUNTER — OFFICE VISIT (OUTPATIENT)
Dept: PULMONOLOGY | Facility: CLINIC | Age: 49
End: 2024-11-27
Payer: COMMERCIAL

## 2024-11-27 VITALS
BODY MASS INDEX: 23.49 KG/M2 | SYSTOLIC BLOOD PRESSURE: 120 MMHG | HEART RATE: 66 BPM | TEMPERATURE: 97.4 F | WEIGHT: 155 LBS | DIASTOLIC BLOOD PRESSURE: 82 MMHG | OXYGEN SATURATION: 96 % | HEIGHT: 68 IN

## 2024-11-27 DIAGNOSIS — R93.89 ABNORMAL CT OF THE CHEST: ICD-10-CM

## 2024-11-27 DIAGNOSIS — Z87.01 HISTORY OF PNEUMONIA: ICD-10-CM

## 2024-11-27 DIAGNOSIS — J45.20 MILD INTERMITTENT ASTHMA WITHOUT COMPLICATION: Primary | ICD-10-CM

## 2024-11-27 DIAGNOSIS — I10 PRIMARY HYPERTENSION: ICD-10-CM

## 2024-11-27 PROBLEM — R06.02 SOBOE (SHORTNESS OF BREATH ON EXERTION): Status: RESOLVED | Noted: 2024-11-27 | Resolved: 2024-11-27

## 2024-11-27 PROBLEM — R06.02 SOBOE (SHORTNESS OF BREATH ON EXERTION): Status: ACTIVE | Noted: 2024-11-27

## 2024-11-27 PROCEDURE — 99214 OFFICE O/P EST MOD 30 MIN: CPT | Performed by: INTERNAL MEDICINE

## 2024-11-27 NOTE — PROGRESS NOTES
Name: Robinson Chaudhari      : 1975      MRN: [id number]  Encounter Provider: Tracee Terry MD  Encounter Date: 2024   Encounter department: [facility] [place]  :  Assessment & Plan  Primary hypertension  He has history of hypertension and is currently on treatment with metoprolol.       Mild intermittent asthma without complication  He likely has mild intermittent asthma.  His chest was clear to auscultation.  I have advised him to continue with albuterol.       History of pneumonia  Robinson Chaudhari was recently admitted to hospital with right-sided pneumonia.  He was treated with Augmentin initially followed by ceftriaxone and is now completing a course of  Omnicef.  He continues to have cough though his shortness of breath and wheeze are getting better.  I reviewed his chest x-ray which showed patchy pneumonia on the right side.  On clinical examination, his chest auscultation is clear.  His CT scan of the chest showed resolving pneumonia. I had started him on albuterol inhaler 2 puffs every 4 hourly for any shortness of breath or wheeze.  He found this very beneficial.  I had a long discussion with him and answered all his questions.        Abnormal CT of the chest  CT scan of the chest showed a 2.0 x 1.3 cm opacity in the periphery of the posterior medial left lower lobe.  A follow-up CT scan has been ordered.             History of Present Illness     HPI  Robinson Chaudhari is a 49 y.o. male who was previously diagnosed with pneumonia on chest x-ray and subsequently had CT scan of the chest which showed resolving right upper lobe pneumonia and bronchiolitis.  He had a left lower lobe opacity which could also be secondary to pneumonia or atelectasis.  A 3-month follow-up CT scan was advised.  He currently is feeling much better.  His exercise tolerance is more or less unrestricted.  He has occasional nonproductive cough and no wheezing.  He has allergies send  "has postnasal drip.  He takes Zyrtec.  He has been using albuterol inhaler on an as-needed basis with benefit.  He denied any fever or chills.  No chest pain no palpitations.  He has hypertension and has been on treatment.  He is overall feeling much better.      Review of Systems   Constitutional:  Negative for appetite change, chills, fatigue and fever.   HENT:  Positive for postnasal drip. Negative for hearing loss, rhinorrhea, sneezing, sore throat and trouble swallowing.    Respiratory:  Positive for shortness of breath. Negative for cough, chest tightness and wheezing.    Cardiovascular:  Negative for chest pain, palpitations and leg swelling.   Gastrointestinal:  Negative for abdominal pain, constipation, diarrhea, nausea and vomiting.   Genitourinary:  Negative for dysuria, frequency and urgency.   Musculoskeletal:  Positive for back pain. Negative for arthralgias and gait problem.   Skin:  Negative for rash.   Allergic/Immunologic: Positive for environmental allergies.   Neurological:  Negative for dizziness, seizures, syncope and headaches.   Psychiatric/Behavioral:  Negative for agitation, confusion and sleep disturbance. The patient is not nervous/anxious.           Objective   /82 (BP Location: Left arm, Patient Position: Sitting, Cuff Size: Standard)   Pulse 66   Temp (!) 97.4 °F (36.3 °C) (Tympanic)   Ht 5' 8\" (1.727 m)   Wt 70.3 kg (155 lb)   SpO2 96%   BMI 23.57 kg/m²      Physical Exam  Vitals reviewed.   Constitutional:       General: He is not in acute distress.     Appearance: He is not ill-appearing, toxic-appearing or diaphoretic.   HENT:      Head: Normocephalic.      Nose: Nose normal.   Eyes:      General: No scleral icterus.     Conjunctiva/sclera: Conjunctivae normal.   Cardiovascular:      Rate and Rhythm: Normal rate and regular rhythm.      Heart sounds: Normal heart sounds. No murmur heard.  Pulmonary:      [place]: Pulmonary effort is normal. No respiratory distress.      " Breath sounds: Normal breath sounds. No stridor. No wheezing, rhonchi or rales.   Chest:      Chest wall: No tenderness.   Abdominal:      General: Bowel sounds are normal.      Palpations: Abdomen is soft.      Tenderness: There is no abdominal tenderness. There is no guarding.   Musculoskeletal:      Cervical back: Neck supple. No rigidity.      Right lower leg: No edema.      Left lower leg: No edema.   Lymphadenopathy:      Cervical: No cervical adenopathy.   Skin:     Coloration: Skin is not jaundiced or pale.      Findings: No rash.   Neurological:      Mental Status: He is alert and oriented to person, place, and time.      Gait: Gait normal.   Psychiatric:         Mood and Affect: Mood normal.         Behavior: Behavior normal.         Thought Content: Thought content normal.         Judgment: Judgment normal.

## 2024-11-27 NOTE — ASSESSMENT & PLAN NOTE
Robinson Chaudhari was recently admitted to hospital with right-sided pneumonia.  He was treated with Augmentin initially followed by ceftriaxone and is now completing a course of  Omnicef.  He continues to have cough though his shortness of breath and wheeze are getting better.  I reviewed his chest x-ray which showed patchy pneumonia on the right side.  On clinical examination, his chest auscultation is clear.  His CT scan of the chest showed resolving pneumonia. I had started him on albuterol inhaler 2 puffs every 4 hourly for any shortness of breath or wheeze.  He found this very beneficial.  I had a long discussion with him and answered all his questions.

## 2024-11-27 NOTE — ASSESSMENT & PLAN NOTE
He likely has mild intermittent asthma.  His chest was clear to auscultation.  I have advised him to continue with albuterol.

## 2024-11-28 PROBLEM — R93.89 ABNORMAL CT OF THE CHEST: Status: ACTIVE | Noted: 2024-11-28

## 2024-11-28 NOTE — ASSESSMENT & PLAN NOTE
CT scan of the chest showed a 2.0 x 1.3 cm opacity in the periphery of the posterior medial left lower lobe.  A follow-up CT scan has been ordered.

## 2024-12-06 DIAGNOSIS — F41.1 GENERALIZED ANXIETY DISORDER: ICD-10-CM

## 2024-12-06 RX ORDER — AMITRIPTYLINE HYDROCHLORIDE 50 MG/1
50 TABLET ORAL
Qty: 90 TABLET | Refills: 1 | Status: SHIPPED | OUTPATIENT
Start: 2024-12-06

## 2024-12-08 DIAGNOSIS — R00.0 SINUS TACHYCARDIA: ICD-10-CM

## 2024-12-10 RX ORDER — METOPROLOL TARTRATE 25 MG/1
25 TABLET, FILM COATED ORAL EVERY 12 HOURS SCHEDULED
Qty: 180 TABLET | Refills: 1 | Status: SHIPPED | OUTPATIENT
Start: 2024-12-10

## 2024-12-12 ENCOUNTER — CONSULT (OUTPATIENT)
Dept: CARDIOLOGY CLINIC | Facility: CLINIC | Age: 49
End: 2024-12-12
Payer: COMMERCIAL

## 2024-12-12 VITALS
SYSTOLIC BLOOD PRESSURE: 120 MMHG | DIASTOLIC BLOOD PRESSURE: 100 MMHG | WEIGHT: 155 LBS | OXYGEN SATURATION: 99 % | HEART RATE: 98 BPM | HEIGHT: 68 IN | BODY MASS INDEX: 23.49 KG/M2

## 2024-12-12 DIAGNOSIS — E78.5 HYPERLIPIDEMIA, UNSPECIFIED HYPERLIPIDEMIA TYPE: ICD-10-CM

## 2024-12-12 DIAGNOSIS — R00.0 SINUS TACHYCARDIA: Primary | ICD-10-CM

## 2024-12-12 DIAGNOSIS — J45.20 MILD INTERMITTENT ASTHMA WITHOUT COMPLICATION: ICD-10-CM

## 2024-12-12 DIAGNOSIS — I10 HYPERTENSION, UNSPECIFIED TYPE: ICD-10-CM

## 2024-12-12 PROCEDURE — 99244 OFF/OP CNSLTJ NEW/EST MOD 40: CPT | Performed by: INTERNAL MEDICINE

## 2024-12-12 NOTE — ASSESSMENT & PLAN NOTE
- Currently being treated with Metoprolol tartrate 25mg BID, now in sinus rhythm   - On Amitriptyline chronically for headaches, normal Qtc   - 5-day Holter monitor(11/2024):   Primary rhythm was Sinus Rhythm. Average heart rate was 79 bpm, Minimum heart rate was 47 bpm on Day 3 / 08:08:51 am, Max heart rate was 137 bpm on Day 2 / 10:54:01 am SVE(s): [place] was < 0.01 %, 9 total SVE(s) PVC(s): [place] was 0.17 %, 977 total PVC(s), 2 disparate morphologies

## 2024-12-12 NOTE — PROGRESS NOTES
Outpatient Consultation - General Cardiology   Robinson Chaudhari 49 y.o. male   MRN: [id number]  Encounter: [id number]      PCP: ARACELIS Barajas    History of Present Illness   Physician Requesting Consult: Consults   Reason for Consult / Principal Problem: Tachycardia    HPI: I had the pleasure to meet Robinsno Chaudhari, a 49 y.o. year old male, with history of recent pneumonia hypertension, anxiety and mild intermittent asthma who was referred to [facility] outpatient Cardiology for ED follow-up.  Patient was seen in the emergency room on 10/21/2024 for shortness of breath, at the time he was being treated for recent diagnosis of pneumonia, however he noticed that he was more short of breath and was satting at 85% on room air.  Started on Albuterol. He was found to be in sinus tachycardia at rest, he was started on Lopressor 25 mg daily and he has since been in normal sinus rhythm.    He's now feeling well, has no acute complaints, denied chest pain, palpitations, shortness of breath. He is very active, goes hiking, kayaking and never has symptoms.   He wears a smart watch and has never noticed fast heart rates while resting.       Assessment & Plan   Plan:  1. No further work up needed at this time. Sinus tachycardia likely due to pneumonia and fever.    2. He was encouraged to improve his diet to lower cholesterol levels. Does not want to start a medication at this time. Repeat lipid panel in 3 months and follow up.         Assessment:  Assessment & Plan  Sinus tachycardia  - Currently being treated with Metoprolol tartrate 25mg BID, now in sinus rhythm   - On Amitriptyline chronically for headaches, normal Qtc   - 5-day Holter monitor(11/2024):   Primary rhythm was Sinus Rhythm. Average heart rate was 79 bpm, Minimum heart rate was 47 bpm on Day 3 / 08:08:51 am, Max heart rate was 137 bpm on Day 2 / 10:54:01 am SVE(s): [place] was < 0.01 %, 9 total SVE(s) PVC(s): [place] was 0.17 %, 977 total PVC(s), 2  disparate morphologies    Hypertension, unspecified type  On Metoprolol tartrate 25mg BID   Mild intermittent asthma without complication  - PRN Albuterol   Hyperlipidemia, unspecified hyperlipidemia type  - , cholesterol 259, Triglyceride 207, HDL 40  - LDL was 252 in 12/2022, started on statins but wasn't able tolerate it due to muscle pain.   - ASCVD 5-6%             Case discussed and reviewed with Dr. Hicks who agrees with my assessment and plan. Thank you for involving us in the care of your patient.  Nanci Reynolds MD  Cardiology Fellow   PGY-6        Review of Systems  Review of system was conducted and was negative except for as stated in the HPI.      Historical Information   Past Medical History:   Diagnosis Date    Anxiety     Hypertension      Past Surgical History:   Procedure Laterality Date    NO PAST SURGERIES       Social History     Substance and Sexual Activity   Alcohol Use Not Currently     Social History     Substance and Sexual Activity   Drug Use Not Currently     Social History     Tobacco Use   Smoking Status Former    Current packs/day: 0.00    Types: Cigarettes    Quit date: 2/19/2014    Years since quitting: 10.8   Smokeless Tobacco Former     Family History: Family history non-contributory    Meds/Allergies   Home Medications:   Current Outpatient Medications:     amitriptyline (ELAVIL) 50 mg tablet, TAKE 1 TABLET BY MOUTH EVERYDAY AT BEDTIME, Disp: 90 tablet, Rfl: 1    benzonatate (TESSALON PERLES) 100 mg capsule, Take 1 capsule (100 mg total) by mouth 3 (three) times a day as needed for cough, Disp: 20 capsule, Rfl: 0    busPIRone (BUSPAR) 7.5 mg tablet, TAKE 1 TABLET BY MOUTH 2 TIMES A DAY., Disp: 180 tablet, Rfl: 1    cetirizine (ZyrTEC) 10 mg tablet, Take 10 mg by mouth daily, Disp: , Rfl:     LORazepam (Ativan) 0.5 mg tablet, Take one 30 minutes prior to flight may repeat x 1 (Patient not taking: Reported on 11/27/2024), Disp: 6 tablet, Rfl: 0    metoprolol tartrate  "(LOPRESSOR) 25 mg tablet, TAKE 1 TABLET (25 MG TOTAL) BY MOUTH EVERY 12 (TWELVE) HOURS, Disp: 180 tablet, Rfl: 1    Allergies   Allergen Reactions    Statins Myalgia         Objective   Vitals: There were no vitals taken for this visit.      Physical Exam    GEN: Robinson Chaudhari appears well, alert and oriented x 3, pleasant and cooperative   HEENT:  Normocephalic, atraumatic, anicteric, moist mucous membranes  NECK: No JVD or carotid bruits   HEART: Regular rhythm, normal rate, normal S1 and S2, no murmurs, clicks, gallops or rubs   LUNGS: Clear to auscultation bilaterally; no wheezes, rales, or rhonchi; respiration nonlabored   ABDOMEN:  Normoactive bowel sounds, soft, no tenderness, no distention  EXTREMITIES: peripheral pulses palpable; no edema  NEURO: no gross focal findings; cranial nerves grossly intact   SKIN:  Dry, intact, warm to touch    Lab Results: I have personally reviewed pertinent lab results.    No results found for: \"HSTNI0\", \"HSTNI2\", \"HSTNI4\", \"HSTNI\"  No results found for: \"NTBNP\"  Lab Results   Component Value Date    TRIG 207 (H) 03/16/2024    HDL 40 03/16/2024    LDLCALC 178 (H) 03/16/2024     Lab Results   Component Value Date    K 4.1 10/20/2024    CO2 23 10/20/2024     10/20/2024    BUN 13 10/20/2024    CREATININE 0.99 10/20/2024     (H) 10/19/2024    AST 55 (H) 10/19/2024    TSH 1.45 11/06/2020     Lab Results   Component Value Date    WBC 10.21 (H) 10/21/2024    HGB 13.9 10/21/2024    HCT 42.4 10/21/2024    MCV 85 10/21/2024     10/21/2024     Lab Results   Component Value Date    INR 0.89 10/19/2024         Previous STRESS TEST:  Results for orders placed during the hospital encounter of 11/29/21    Stress test only, exercise    Interpretation Summary    Stress ECG: Overall, the patient's exercise capacity was normal for their age. The patient reached stage 3.0 of the protocol after exercising for 7 min and 30 sec and had a maximal HR of 162 bpm (94 % of MPHR) and " 10.1 METS. The patient experienced no angina during the test. The patient achieved the target heart rate. The test was stopped because of Leg fatigue. Blood pressure demonstrated a normal response and heart rate demonstrated an exaggerated response to stress. The patient's heart rate recovery was normal.    Stress ECG: The stress ECG is negative for ischemia after maximal exercise, without reproduction of symptoms.    Normal study after maximal exercise.     No results found for this or any previous visit.    No results found for this or any previous visit.      Previous Cath/PCI:  No results found for this or any previous visit.    No results found for this or any previous visit.    No results found for this or any previous visit.      ECHO:  No results found for this or any previous visit.    No results found for this or any previous visit.      ELLIOTT:  No results found for this or any previous visit.    No results found for this or any previous visit.      CMR:  No results found for this or any previous visit.    No results found for this or any previous visit.    No results found for this or any previous visit.      HOLTER  No results found for this or any previous visit.    No results found for this or any previous visit.

## 2025-03-03 ENCOUNTER — HOSPITAL ENCOUNTER (OUTPATIENT)
Dept: CT IMAGING | Facility: CLINIC | Age: 50
Discharge: HOME | End: 2025-03-03
Payer: COMMERCIAL

## 2025-03-03 DIAGNOSIS — J18.9 PNEUMONIA OF LEFT LOWER LOBE DUE TO INFECTIOUS ORGANISM: ICD-10-CM

## 2025-03-03 PROCEDURE — 71250 CT THORAX DX C-: CPT

## 2025-03-06 ENCOUNTER — RESULTS FOLLOW-UP (OUTPATIENT)
Dept: FAMILY MEDICINE CLINIC | Facility: CLINIC | Age: 50
End: 2025-03-06

## 2025-03-26 DIAGNOSIS — F41.1 GENERALIZED ANXIETY DISORDER: ICD-10-CM

## 2025-03-27 RX ORDER — BUSPIRONE HYDROCHLORIDE 7.5 MG/1
7.5 TABLET ORAL 2 TIMES DAILY
Qty: 180 TABLET | Refills: 1 | Status: SHIPPED | OUTPATIENT
Start: 2025-03-27

## 2025-05-31 DIAGNOSIS — F41.1 GENERALIZED ANXIETY DISORDER: ICD-10-CM

## 2025-06-01 DIAGNOSIS — F40.243 FEAR OF FLYING: ICD-10-CM

## 2025-06-01 RX ORDER — AMITRIPTYLINE HYDROCHLORIDE 50 MG/1
50 TABLET ORAL
Qty: 90 TABLET | Refills: 0 | Status: SHIPPED | OUTPATIENT
Start: 2025-06-01

## 2025-06-02 RX ORDER — LORAZEPAM 0.5 MG/1
TABLET ORAL
Qty: 6 TABLET | Refills: 0 | Status: SHIPPED | OUTPATIENT
Start: 2025-06-02

## 2025-06-08 DIAGNOSIS — R00.0 SINUS TACHYCARDIA: ICD-10-CM

## 2025-06-08 RX ORDER — METOPROLOL TARTRATE 25 MG/1
25 TABLET, FILM COATED ORAL 2 TIMES DAILY
Qty: 180 TABLET | Refills: 1 | Status: SHIPPED | OUTPATIENT
Start: 2025-06-08

## 2025-06-14 ENCOUNTER — HOSPITAL ENCOUNTER (EMERGENCY)
Facility: HOSPITAL | Age: 50
Discharge: HOME | End: 2025-06-14
Admitting: EMERGENCY MEDICINE
Payer: COMMERCIAL

## 2025-06-14 VITALS
SYSTOLIC BLOOD PRESSURE: 150 MMHG | HEART RATE: 91 BPM | RESPIRATION RATE: 22 BRPM | TEMPERATURE: 97.9 F | OXYGEN SATURATION: 99 % | DIASTOLIC BLOOD PRESSURE: 97 MMHG

## 2025-06-14 DIAGNOSIS — I10 HYPERTENSION: ICD-10-CM

## 2025-06-14 DIAGNOSIS — R00.0 SINUS TACHYCARDIA: Primary | ICD-10-CM

## 2025-06-14 DIAGNOSIS — R79.89 ELEVATED LFTS: ICD-10-CM

## 2025-06-14 DIAGNOSIS — R12 HEART BURN: ICD-10-CM

## 2025-06-14 LAB
2HR DELTA HS TROPONIN: 0 NG/L
ALBUMIN SERPL BCG-MCNC: 4.6 G/DL (ref 3.5–5)
ALP SERPL-CCNC: 95 U/L (ref 34–104)
ALT SERPL W P-5'-P-CCNC: 64 U/L (ref 7–52)
ANION GAP SERPL CALCULATED.3IONS-SCNC: 10 MMOL/L (ref 4–13)
AST SERPL W P-5'-P-CCNC: 40 U/L (ref 13–39)
BASOPHILS # BLD AUTO: 0.04 THOUSANDS/ÂΜL (ref 0–0.1)
BASOPHILS NFR BLD AUTO: 1 % (ref 0–1)
BILIRUB SERPL-MCNC: 0.95 MG/DL (ref 0.2–1)
BUN SERPL-MCNC: 13 MG/DL (ref 5–25)
CALCIUM SERPL-MCNC: 9.9 MG/DL (ref 8.4–10.2)
CARDIAC TROPONIN I PNL SERPL HS: 3 NG/L (ref ?–50)
CARDIAC TROPONIN I PNL SERPL HS: 3 NG/L (ref ?–50)
CHLORIDE SERPL-SCNC: 103 MMOL/L (ref 96–108)
CO2 SERPL-SCNC: 27 MMOL/L (ref 21–32)
CREAT SERPL-MCNC: 1.18 MG/DL (ref 0.6–1.3)
D DIMER PPP FEU-MCNC: <0.27 UG/ML FEU
EOSINOPHIL # BLD AUTO: 0.12 THOUSAND/ÂΜL (ref 0–0.61)
EOSINOPHIL NFR BLD AUTO: 2 % (ref 0–6)
ERYTHROCYTE [DISTWIDTH] IN BLOOD BY AUTOMATED COUNT: 12.9 % (ref 11.6–15.1)
FLUAV AG UPPER RESP QL IA.RAPID: NEGATIVE
FLUBV AG UPPER RESP QL IA.RAPID: NEGATIVE
GFR SERPL CREATININE-BSD FRML MDRD: 71 ML/MIN/1.73SQ M
GLUCOSE SERPL-MCNC: 94 MG/DL (ref 65–140)
HCT VFR BLD AUTO: 49 % (ref 36.5–49.3)
HGB BLD-MCNC: 16.3 G/DL (ref 12–17)
IMM GRANULOCYTES # BLD AUTO: 0.05 THOUSAND/UL (ref 0–0.2)
IMM GRANULOCYTES NFR BLD AUTO: 1 % (ref 0–2)
LYMPHOCYTES # BLD AUTO: 1.56 THOUSANDS/ÂΜL (ref 0.6–4.47)
LYMPHOCYTES NFR BLD AUTO: 20 % (ref 14–44)
MCH RBC QN AUTO: 27.9 PG (ref 26.8–34.3)
MCHC RBC AUTO-ENTMCNC: 33.3 G/DL (ref 31.4–37.4)
MCV RBC AUTO: 84 FL (ref 82–98)
MONOCYTES # BLD AUTO: 0.43 THOUSAND/ÂΜL (ref 0.17–1.22)
MONOCYTES NFR BLD AUTO: 5 % (ref 4–12)
NEUTROPHILS # BLD AUTO: 5.77 THOUSANDS/ÂΜL (ref 1.85–7.62)
NEUTS SEG NFR BLD AUTO: 71 % (ref 43–75)
NRBC BLD AUTO-RTO: 0 /100 WBCS
PLATELET # BLD AUTO: 215 THOUSANDS/UL (ref 149–390)
PMV BLD AUTO: 11.7 FL (ref 8.9–12.7)
POTASSIUM SERPL-SCNC: 3.6 MMOL/L (ref 3.5–5.3)
PROT SERPL-MCNC: 7.9 G/DL (ref 6.4–8.4)
RBC # BLD AUTO: 5.84 MILLION/UL (ref 3.88–5.62)
SARS-COV+SARS-COV-2 AG RESP QL IA.RAPID: NEGATIVE
SODIUM SERPL-SCNC: 140 MMOL/L (ref 135–147)
TSH SERPL DL<=0.05 MIU/L-ACNC: 1.64 UIU/ML (ref 0.45–4.5)
WBC # BLD AUTO: 7.97 THOUSAND/UL (ref 4.31–10.16)

## 2025-06-14 PROCEDURE — 84484 ASSAY OF TROPONIN QUANT: CPT

## 2025-06-14 PROCEDURE — 99285 EMERGENCY DEPT VISIT HI MDM: CPT

## 2025-06-14 PROCEDURE — 93005 ELECTROCARDIOGRAM TRACING: CPT

## 2025-06-14 PROCEDURE — 99283 EMERGENCY DEPT VISIT LOW MDM: CPT

## 2025-06-14 PROCEDURE — 85025 COMPLETE CBC W/AUTO DIFF WBC: CPT

## 2025-06-14 PROCEDURE — 96360 HYDRATION IV INFUSION INIT: CPT

## 2025-06-14 PROCEDURE — 85379 FIBRIN DEGRADATION QUANT: CPT

## 2025-06-14 PROCEDURE — 96361 HYDRATE IV INFUSION ADD-ON: CPT

## 2025-06-14 PROCEDURE — 87811 SARS-COV-2 COVID19 W/OPTIC: CPT

## 2025-06-14 PROCEDURE — 84443 ASSAY THYROID STIM HORMONE: CPT

## 2025-06-14 PROCEDURE — 36415 COLL VENOUS BLD VENIPUNCTURE: CPT

## 2025-06-14 PROCEDURE — 80053 COMPREHEN METABOLIC PANEL: CPT

## 2025-06-14 PROCEDURE — 87804 INFLUENZA ASSAY W/OPTIC: CPT

## 2025-06-14 RX ORDER — FAMOTIDINE 20 MG/1
20 TABLET, FILM COATED ORAL 2 TIMES DAILY
Qty: 30 TABLET | Refills: 0 | Status: SHIPPED | OUTPATIENT
Start: 2025-06-14

## 2025-06-14 RX ORDER — METOPROLOL TARTRATE 25 MG/1
25 TABLET, FILM COATED ORAL ONCE
Status: COMPLETED | OUTPATIENT
Start: 2025-06-14 | End: 2025-06-14

## 2025-06-14 RX ADMIN — METOPROLOL TARTRATE 25 MG: 25 TABLET, FILM COATED ORAL at 19:50

## 2025-06-14 RX ADMIN — SODIUM CHLORIDE 1000 ML: 0.9 INJECTION, SOLUTION INTRAVENOUS at 20:33

## 2025-06-15 LAB
ATRIAL RATE: 116 BPM
P AXIS: 57 DEGREES
PR INTERVAL: 146 MS
QRS AXIS: 69 DEGREES
QRSD INTERVAL: 94 MS
QT INTERVAL: 318 MS
QTC INTERVAL: 442 MS
T WAVE AXIS: 31 DEGREES
VENTRICULAR RATE: 116 BPM

## 2025-06-15 PROCEDURE — 93010 ELECTROCARDIOGRAM REPORT: CPT | Performed by: INTERNAL MEDICINE

## 2025-06-15 NOTE — ED PROVIDER NOTES
Time reflects when diagnosis was documented in both MDM as applicable and the Disposition within this note       Time User Action Codes Description Comment    6/14/2025  9:25 PM Priya Alfaro Add [R00.0] Sinus tachycardia     6/14/2025  9:26 PM Priya Alfaro Add [I10] Hypertension     6/14/2025  9:26 PM Priya Alfaro Add [R79.89] Elevated LFTs     6/14/2025 10:31 PM Mary Hutson [R12] Heart burn           ED Disposition       ED Disposition   Discharge    Condition   Stable    Date/Time   Sat Jun 14, 2025 10:23 PM    Comment   Roibnson AMINTA Chaudhari discharge to home/self care.                   Assessment & Plan       Medical Decision Making  50y M presenting for evaluation of elevated blood pressure    States he likely did not take his metoprolol this morning, noted to be tachycardic    Differential includes but is not limited to hypertension, endorgan damage, dehydration, PE, thyroid abnormality, electrolyte derangement, anemia    Plan blood work    Evaluation negative apart from mild transaminase elevation, noted to be present on prior bloodwork, improved today. BP elevated, HR elevated, improved after metoprolol administration, patient was likely hypertensive, tachycardic after not taking metoprolol this morning.  Improved in the emergency department.  Dimer negative, doubt PE. TSH WNL. Initial troponin negative and ECG without acute findings.  Disposition pending second troponin, if normal anticipate discharge home with PCP follow-up. Otherwise no additional signs of end organ damage, ambulating without difficulty. Patient signed out to Dr. Mary Hutson pending final troponin and disposition.        Amount and/or Complexity of Data Reviewed  Labs: ordered. Decision-making details documented in ED Course.    Risk  Prescription drug management.        ED Course as of 06/20/25 1551   Sat Jun 14, 2025   1930 ECG interpretation by me.  Sinus tachycardia at 116.  Normal intervals.  Normal axis.  No acute T  wave abnormality.  No ST elevation.  When compared to prior dated October 2024 similar morphologies, no acute changes.   2022 D-Dimer, Quant: <0.27   2023 FLU/COVID Rapid Antigen (30 min. TAT) - Preferred screening test in ED   2105 TSH 3RD GENERATON: 1.636  WNL       Medications   metoprolol tartrate (LOPRESSOR) tablet 25 mg (25 mg Oral Given 6/14/25 1950)   sodium chloride 0.9 % bolus 1,000 mL (0 mL Intravenous Stopped 6/14/25 2252)       ED Risk Strat Scores                    No data recorded                            History of Present Illness       Chief Complaint   Patient presents with    Hypertension     Patient stated that he's unsure if he took his BP medication at home. Patient is having HA and dizziness and he feels cold.        Past Medical History[1]   Past Surgical History[2]   Family History[3]   Social History[4]   E-Cigarette/Vaping    E-Cigarette Use Never User       E-Cigarette/Vaping Substances    Nicotine No     THC No     CBD No     Flavoring No     Other No     Unknown No       I have reviewed and agree with the history as documented.     Patient is a 50-year-old male with past medical history significant for anxiety, sinus tachycardia, elevated blood pressure presenting the emergency department for evaluation of elevated blood pressure.  Patient states that he is unsure if he took his morning metoprolol dose this morning.  He noted throughout the day he had intermittent feelings of hot and then intermittent feelings of cold.  He reports a mild headache at baseline that he attributes to the weather, he has a prescription for amitriptyline to help with headaches.  He is unsure if he has a headache today or not.  He denies any current chest pain, shortness of breath, fevers, chills, numbness, weakness, tingling of extremities, change in vision, cough.  He reports he took his blood pressure at home and that it was a systolic reading of 180 and became concerned so he presented to the emergency  department for further evaluation.        Review of Systems   Constitutional:  Negative for chills and fever.   HENT:  Negative for ear pain and sore throat.    Eyes:  Negative for pain and visual disturbance.   Respiratory:  Negative for cough and shortness of breath.    Cardiovascular:  Negative for chest pain and palpitations.   Gastrointestinal:  Negative for abdominal pain and vomiting.   Genitourinary:  Negative for dysuria and hematuria.   Musculoskeletal:  Negative for arthralgias and back pain.   Skin:  Negative for color change and rash.   Neurological:  Negative for seizures and syncope.   All other systems reviewed and are negative.          Objective       ED Triage Vitals   Temperature Pulse Blood Pressure Respirations SpO2 Patient Position - Orthostatic VS   06/14/25 1930 06/14/25 1923 06/14/25 1923 06/14/25 1923 06/14/25 1923 06/14/25 1923   97.9 °F (36.6 °C) (!) 125 (!) 214/107 (!) 24 97 % Lying      Temp Source Heart Rate Source BP Location FiO2 (%) Pain Score    06/14/25 1930 06/14/25 1923 06/14/25 1923 -- 06/14/25 1923    Oral Monitor Right arm  No Pain      Vitals      Date and Time Temp Pulse SpO2 Resp BP Pain Score FACES Pain Rating User   06/14/25 2230 -- 91 99 % 22 150/97 -- --    06/14/25 2200 -- 92 97 % 20 166/101 -- --    06/14/25 2100 -- 107 97 % 19 158/97 -- --    06/14/25 2055 -- 103 -- -- -- -- -- FM   06/14/25 2030 -- 115 97 % 20 140/93 -- --    06/14/25 2015 -- 114 98 % 19 138/101 -- --    06/14/25 1930 97.9 °F (36.6 °C) 118 98 % 17 187/101 -- --    06/14/25 1923 -- 125 97 % 24 214/107 No Pain -- FMS            Physical Exam  Vitals and nursing note reviewed.   Constitutional:       General: He is not in acute distress.     Appearance: Normal appearance. He is not ill-appearing, toxic-appearing or diaphoretic.   HENT:      Head: Normocephalic and atraumatic.     Eyes:      General: No scleral icterus.        Right eye: No discharge.         Left eye: No discharge.       Conjunctiva/sclera: Conjunctivae normal.       Cardiovascular:      Rate and Rhythm: Normal rate.      Pulses: Normal pulses.      Comments: 2+ radial pulse  Pulmonary:      Effort: Pulmonary effort is normal. No respiratory distress.      Breath sounds: Normal breath sounds. No stridor. No wheezing, rhonchi or rales.   Abdominal:      General: Abdomen is flat. Bowel sounds are normal. There is no distension.      Palpations: Abdomen is soft.      Tenderness: There is no abdominal tenderness. There is no guarding or rebound.     Musculoskeletal:         General: No swelling. Normal range of motion.      Cervical back: Normal range of motion. No rigidity.      Right lower leg: No edema.      Left lower leg: No edema.     Skin:     General: Skin is warm and dry.      Capillary Refill: Capillary refill takes less than 2 seconds.      Coloration: Skin is not jaundiced.      Findings: No bruising or lesion.     Neurological:      General: No focal deficit present.      Mental Status: He is alert and oriented to person, place, and time. Mental status is at baseline.      Comments: GCS 15, moving all extremities, ambulates to restroom without difficulty   Psychiatric:         Mood and Affect: Mood normal.         Behavior: Behavior normal.         Thought Content: Thought content normal.         Judgment: Judgment normal.         Results Reviewed       Procedure Component Value Units Date/Time    HS Troponin I 2hr [[id number]]  (Normal) Collected: 06/14/25 2151    Lab Status: Final result Specimen: Blood from Arm, Right Updated: 06/14/25 2223     hs TnI 2hr 3 ng/L      Delta 2hr hsTnI 0 ng/L     TSH [[id number]]  (Normal) Collected: 06/14/25 1949    Lab Status: Final result Specimen: Blood from Arm, Right Updated: 06/14/25 2103     TSH 3RD GENERATION 1.636 uIU/mL     HS Troponin 0hr (reflex protocol) [[id number]]  (Normal) Collected: 06/14/25 1949    Lab Status: Final result Specimen: Blood from Arm, Right Updated: 06/14/25  2036     hs TnI 0hr 3 ng/L     D-dimer, quantitative [[id number]]  (Normal) Collected: 06/14/25 1949    Lab Status: Final result Specimen: Blood from Arm, Right Updated: 06/14/25 2022     D-Dimer, Quant <0.27 ug/ml FEU     Narrative:      In the evaluation for possible pulmonary embolism, in the appropriate (Well's Score of 4 or less) patient, the age adjusted d-dimer cutoff for this patient can be calculated as:    Age x 0.01 (in ug/mL) for Age-adjusted D-dimer exclusion threshold for a patient over 50 years.    Comprehensive metabolic panel [[id number]]  (Abnormal) Collected: 06/14/25 1949    Lab Status: Final result Specimen: Blood from Arm, Right Updated: 06/14/25 2017     Sodium 140 mmol/L      Potassium 3.6 mmol/L      Chloride 103 mmol/L      CO2 27 mmol/L      ANION GAP 10 mmol/L      BUN 13 mg/dL      Creatinine 1.18 mg/dL      Glucose 94 mg/dL      Calcium 9.9 mg/dL      AST 40 U/L      ALT 64 U/L      Alkaline Phosphatase 95 U/L      Total Protein 7.9 g/dL      Albumin 4.6 g/dL      Total Bilirubin 0.95 mg/dL      eGFR 71 ml/min/1.73sq m     Narrative:      National Kidney Disease Foundation guidelines for Chronic Kidney Disease (CKD):     Stage 1 with normal or high GFR (GFR > 90 mL/min/1.73 square meters)    Stage 2 Mild CKD (GFR = 60-89 mL/min/1.73 square meters)    Stage 3A Moderate CKD (GFR = 45-59 mL/min/1.73 square meters)    Stage 3B Moderate CKD (GFR = 30-44 mL/min/1.73 square meters)    Stage 4 Severe CKD (GFR = 15-29 mL/min/1.73 square meters)    Stage 5 End Stage CKD (GFR <15 mL/min/1.73 square meters)  Note: GFR calculation is accurate only with a steady state creatinine    FLU/COVID Rapid Antigen (30 min. TAT) - Preferred screening test in ED [[id number]]  (Normal) Collected: 06/14/25 1949    Lab Status: Final result Specimen: Nares from Nose Updated: 06/14/25 2016     SARS COV Rapid Antigen Negative     Influenza A Rapid Antigen Negative     Influenza B Rapid Antigen Negative    Narrative:       This test has been performed using the Quidel [facility]ad 2 FLU+SARS Antigen test under the Emergency Use Authorization (EUA). This test has been validated by the  and verified by the performing laboratory. The Soledad uses lateral flow immunofluorescent sandwich assay to detect SARS-COV, Influenza A and Influenza B Antigen.     The Quidel Soledad 2 SARS Antigen test does not differentiate between SARS-CoV and SARS-CoV-2.     Negative results are presumptive and may be confirmed with a molecular assay, if necessary, for patient management. Negative results do not rule out SARS-CoV-2 or influenza infection and should not be used as the sole basis for treatment or patient management decisions. A negative test result may occur if the level of antigen in a sample is below the limit of detection of this test.     Positive results are indicative of the presence of viral antigens, but do not rule out bacterial infection or co-infection with other viruses.     All test results should be used as an adjunct to clinical observations and other information available to the provider.    FOR PEDIATRIC PATIENTS - copy/paste COVID Guidelines URL to browser: [email or web address]    CBC and differential [[id number]]  (Abnormal) Collected: 06/14/25 1949    Lab Status: Final result Specimen: Blood from Arm, Right Updated: 06/14/25 1959     WBC 7.97 Thousand/uL      RBC 5.84 Million/uL      Hemoglobin 16.3 g/dL      Hematocrit 49.0 %      MCV 84 fL      MCH 27.9 pg      MCHC 33.3 g/dL      RDW 12.9 %      MPV 11.7 fL      Platelets 215 Thousands/uL      nRBC 0 /100 WBCs      Segmented % 71 %      Immature Grans % 1 %      Lymphocytes % 20 %      Monocytes % 5 %      Eosinophils Relative 2 %      Basophils Relative 1 %      Absolute Neutrophils 5.77 Thousands/µL      Absolute Immature Grans 0.05 Thousand/uL      Absolute Lymphocytes 1.56 Thousands/µL      Absolute Monocytes 0.43  Thousand/µL      Eosinophils Absolute 0.12 Thousand/µL      Basophils Absolute 0.04 Thousands/µL             No orders to display       Procedures    ED Medication and Procedure Management   Prior to Admission Medications   Prescriptions Last Dose Informant Patient Reported? Taking?   LORazepam (Ativan) 0.5 mg tablet   No No   Sig: Take one 30 minutes prior to flight may repeat x 1   amitriptyline (ELAVIL) 50 mg tablet   No No   Sig: TAKE 1 TABLET BY MOUTH EVERYDAY AT BEDTIME   benzonatate (TESSALON PERLES) 100 mg capsule  Self No No   Sig: Take 1 capsule (100 mg total) by mouth 3 (three) times a day as needed for cough   busPIRone (BUSPAR) 7.5 mg tablet   No No   Sig: Take 1 tablet (7.5 mg total) by mouth 2 (two) times a day   cetirizine (ZyrTEC) 10 mg tablet  Self Yes No   Sig: Take 10 mg by mouth daily   metoprolol tartrate (LOPRESSOR) 25 mg tablet   No No   Sig: TAKE 1 TABLET (25 MG TOTAL) BY MOUTH EVERY 12 (TWELVE) HOURS      Facility-Administered Medications: None     Discharge Medication List as of 6/14/2025 10:24 PM        CONTINUE these medications which have NOT CHANGED    Details   amitriptyline (ELAVIL) 50 mg tablet TAKE 1 TABLET BY MOUTH EVERYDAY AT BEDTIME, Starting Sun 6/1/2025, Normal      benzonatate (TESSALON PERLES) 100 mg capsule Take 1 capsule (100 mg total) by mouth 3 (three) times a day as needed for cough, Starting Mon 10/21/2024, Normal      busPIRone (BUSPAR) 7.5 mg tablet Take 1 tablet (7.5 mg total) by mouth 2 (two) times a day, Starting u 3/27/2025, Normal      cetirizine (ZyrTEC) 10 mg tablet Take 10 mg by mouth daily, Historical Med      LORazepam (Ativan) 0.5 mg tablet Take one 30 minutes prior to flight may repeat x 1, Normal      metoprolol tartrate (LOPRESSOR) 25 mg tablet TAKE 1 TABLET (25 MG TOTAL) BY MOUTH EVERY 12 (TWELVE) HOURS, Starting Sun 6/8/2025, Normal           No discharge procedures on file.  ED SEPSIS DOCUMENTATION   Time reflects when diagnosis was documented in  both MDM as applicable and the Disposition within this note       Time User Action Codes Description Comment    2025  9:25 PM Priya Alfaro Add [R00.0] Sinus tachycardia     2025  9:26 PM Priya Alfaro Add [I10] Hypertension     2025  9:26 PM Priya Alfaro Add [R79.89] Elevated LFTs     2025 10:31 PM Mary Hutson Add [R12] Heart burn                    [1]   Past Medical History:  Diagnosis Date    Anxiety     Hypertension    [2]   Past Surgical History:  Procedure Laterality Date    NO PAST SURGERIES     [3] No family history on file.  [4]   Social History  Tobacco Use    Smoking status: Former     Current packs/day: 0.00     Types: Cigarettes     Quit date: 2014     Years since quittin.3    Smokeless tobacco: Former   Vaping Use    Vaping status: Never Used   Substance Use Topics    Alcohol use: Yes     Comment: Socially    Drug use: Not Currently        Priya Alfaro DO  25 1551

## 2025-06-15 NOTE — DISCHARGE INSTRUCTIONS
Follow-up with your family doctor within 1 week for repeat blood pressure check and further evaluation of your elevated liver enzymes..  Please return to the emergency department for any new or worsening symptoms.  Continue taking your metoprolol.

## 2025-06-19 ENCOUNTER — OFFICE VISIT (OUTPATIENT)
Dept: FAMILY MEDICINE CLINIC | Facility: CLINIC | Age: 50
End: 2025-06-19
Payer: COMMERCIAL

## 2025-06-19 ENCOUNTER — APPOINTMENT (OUTPATIENT)
Dept: LAB | Facility: CLINIC | Age: 50
End: 2025-06-19
Payer: COMMERCIAL

## 2025-06-19 VITALS
TEMPERATURE: 97.4 F | DIASTOLIC BLOOD PRESSURE: 86 MMHG | HEART RATE: 79 BPM | BODY MASS INDEX: 24.4 KG/M2 | WEIGHT: 161 LBS | OXYGEN SATURATION: 100 % | HEIGHT: 68 IN | SYSTOLIC BLOOD PRESSURE: 128 MMHG

## 2025-06-19 DIAGNOSIS — J45.20 MILD INTERMITTENT ASTHMA WITHOUT COMPLICATION: ICD-10-CM

## 2025-06-19 DIAGNOSIS — Z12.5 SCREENING FOR PROSTATE CANCER: ICD-10-CM

## 2025-06-19 DIAGNOSIS — Z00.00 ANNUAL PHYSICAL EXAM: ICD-10-CM

## 2025-06-19 DIAGNOSIS — F41.1 GENERALIZED ANXIETY DISORDER: ICD-10-CM

## 2025-06-19 DIAGNOSIS — I10 PRIMARY HYPERTENSION: Primary | ICD-10-CM

## 2025-06-19 DIAGNOSIS — R79.89 ELEVATED LFTS: ICD-10-CM

## 2025-06-19 DIAGNOSIS — R00.0 SINUS TACHYCARDIA: ICD-10-CM

## 2025-06-19 DIAGNOSIS — Z12.11 COLON CANCER SCREENING: ICD-10-CM

## 2025-06-19 LAB
ALBUMIN SERPL BCG-MCNC: 4.5 G/DL (ref 3.5–5)
ALP SERPL-CCNC: 91 U/L (ref 34–104)
ALT SERPL W P-5'-P-CCNC: 44 U/L (ref 7–52)
AST SERPL W P-5'-P-CCNC: 29 U/L (ref 13–39)
BILIRUB DIRECT SERPL-MCNC: 0.12 MG/DL (ref 0–0.2)
BILIRUB SERPL-MCNC: 1.03 MG/DL (ref 0.2–1)
PROT SERPL-MCNC: 7.3 G/DL (ref 6.4–8.4)
PSA SERPL-MCNC: 1.07 NG/ML (ref 0–4)

## 2025-06-19 PROCEDURE — G0103 PSA SCREENING: HCPCS

## 2025-06-19 PROCEDURE — 36415 COLL VENOUS BLD VENIPUNCTURE: CPT

## 2025-06-19 PROCEDURE — 99396 PREV VISIT EST AGE 40-64: CPT

## 2025-06-19 PROCEDURE — 80076 HEPATIC FUNCTION PANEL: CPT

## 2025-06-19 PROCEDURE — 99213 OFFICE O/P EST LOW 20 MIN: CPT

## 2025-06-19 NOTE — ASSESSMENT & PLAN NOTE
BP well controlled today, 128/86 mmHg  Pt believes he forgot to take his medication the morning he went to the ER which would explain his elevated BP and HR  Will continue his metoprolol 25 mg BID  Advised to monitor his BP and pulse at home daily for the next two weeks, then follow up with PCP in two weeks for BP check to ensure it is remaining in good range  ER precautions given

## 2025-06-19 NOTE — ASSESSMENT & PLAN NOTE
Physical exam unremarkable  Up to date on CBC/CMP. Has order for lipid panel, advised to get completed.   Due for colon cancer screening -- cologuard ordered  PSA ordered  Declines Zoster/Prevnar today   Follow up with PCP as scheduled

## 2025-06-19 NOTE — PROGRESS NOTES
Adult Annual Physical  Name: Robinson Chaudhari      : 1975      MRN: [id number]  Encounter Provider: Alena Hanna PA-C  Encounter Date: 2025   Encounter department: [facility]    :  Assessment & Plan  Primary hypertension  BP well controlled today, 128/86 mmHg  Pt believes he forgot to take his medication the morning he went to the ER which would explain his elevated BP and HR  Will continue his metoprolol 25 mg BID  Advised to monitor his BP and pulse at home daily for the next two weeks, then follow up with PCP in two weeks for BP check to ensure it is remaining in good range  ER precautions given        Mild intermittent asthma without complication  Well controlled on current albuterol inhaler PRN   Sees pulmonology -- last visit 2024        Sinus tachycardia  Chronic  Has seen cardiology for this   Continues on metoprolol 25 mg BID  Pulse in normal range today -- 79 bpm  Continue to monitor        Generalized anxiety disorder  Currently on buspar 7.5 mg BID  Reports his cardiologist told him he should be on an SSRI  He reports he would like to wait to discuss this with his PCP  Therefore will follow up with PCP to discuss further        Elevated LFTs  Last two CMP showed elevated LFTs   Will check hepatic function panel and US of liver for further evaluation     Orders:    Hepatic function panel; Future    US right upper quadrant; Future    Annual physical exam  Physical exam unremarkable  Up to date on CBC/CMP. Has order for lipid panel, advised to get completed.   Due for colon cancer screening -- cologuard ordered  PSA ordered  Declines Zoster/Prevnar today   Follow up with PCP as scheduled       Colon cancer screening    Orders:    Cologuard    Screening for prostate cancer    Orders:    PSA, Total Screen; Future        Preventive Screenings:  - Diabetes Screening: screening up-to-date  - Cholesterol Screening: has hyperlipidemia   - Colon cancer screening: orders  placed   - Lung cancer screening: screening not indicated   - Prostate cancer screening: orders placed     Immunizations:  - Immunizations due: Prevnar 20 and Zoster (Shingrix)  - The patient declines recommended vaccines currently despite my recommendations      Counseling/Anticipatory Guidance:    - Diet: discussed recommendations for a healthy/well-balanced diet.   - Exercise: the importance of regular exercise/physical activity was discussed. Recommend exercise 3-5 times per week for at least 30 minutes.          History of Present Illness       Patient presents for annual physical and ER follow up  He was seen in the ER on 6/14 for HTN and tachycardia. He believes he forgot to take his metoprolol which is the reason for the BP/HR being elevated. They have him metoprolol in the ER and his numbers went down. Work up including troponin and EKG were unremarkable. They also checked a TSH which was normal. His liver enzymes were high.   He has a hx of sinus tachycardia which he has seen cardiology for in the past which is why he is on the metoprolol.   He reports he is feeling back to normal.   He does note he has felt more anxious recently, however would rather wait to speak to his PCP about this.     Adult Annual Physical:  Patient presents for annual physical.     Diet and Physical Activity:  - Diet/Nutrition: no special diet.  - Exercise: no formal exercise and 1-2 times a week on average.    Depression Screening:  - PHQ-2 Score: 0    General Health:  - Sleep: 7-8 hours of sleep on average and > 8 hours of sleep on average.  - Hearing: normal hearing bilateral ears.  - Vision: wears glasses and most recent eye exam > 1 year ago.  - Dental: no dental visits for > 1 year.     Health:    - Urinary symptoms: urinary retention.     Review of Systems   Constitutional:  Negative for fever.   Respiratory:  Negative for chest tightness and shortness of breath.    Cardiovascular:  Negative for chest pain and  "palpitations.   Gastrointestinal:  Negative for abdominal pain.   Neurological:  Negative for dizziness, light-headedness and headaches.     Medical History Reviewed by provider this encounter:     .  Past Medical History   Past Medical History[1]  Past Surgical History[2]  Family History[3]   reports that he quit smoking about 11 years ago. His smoking use included cigarettes. He has quit using smokeless tobacco. He reports current alcohol use. He reports that he does not currently use drugs.  Current Outpatient Medications   Medication Instructions    amitriptyline (ELAVIL) 50 mg, Oral, Daily at bedtime,       benzonatate (TESSALON PERLES) 100 mg, Oral, 3 times daily PRN    busPIRone (BUSPAR) 7.5 mg, Oral, 2 times daily    cetirizine (ZYRTEC) 10 mg, Daily    famotidine (PEPCID) 20 mg, Oral, 2 times daily    LORazepam (Ativan) 0.5 mg tablet Take one 30 minutes prior to flight may repeat x 1    metoprolol tartrate (LOPRESSOR) 25 mg, Oral, 2 times daily   Allergies[4]   Medications Ordered Prior to Encounter[5]   Social History[6]    Objective   /86   Pulse 79   Temp (!) 97.4 °F (36.3 °C)   Ht 5' 8\" (1.727 m)   Wt 73 kg (161 lb)   SpO2 100%   BMI 24.48 kg/m²     Physical Exam  Vitals reviewed.   Constitutional:       General: He is not in acute distress.     Appearance: Normal appearance. He is not ill-appearing or diaphoretic.   HENT:      Head: Normocephalic and atraumatic.      Right Ear: Tympanic membrane, ear canal and external ear normal. There is no impacted cerumen.      Left Ear: Tympanic membrane, ear canal and external ear normal. There is no impacted cerumen.      Nose: Nose normal.      Mouth/Throat:      Mouth: Mucous membranes are moist.      Pharynx: Oropharynx is clear.     Eyes:      General:         Right eye: No discharge.         Left eye: No discharge.      Conjunctiva/sclera: Conjunctivae normal.       Cardiovascular:      Rate and Rhythm: Normal rate and regular rhythm.      Heart " sounds: Normal heart sounds. No murmur heard.  Pulmonary:      Effort: Pulmonary effort is normal. No respiratory distress.      Breath sounds: Normal breath sounds. No wheezing, rhonchi or rales.   Abdominal:      General: There is no distension.      Palpations: Abdomen is soft.      Tenderness: There is no abdominal tenderness.     Musculoskeletal:      Cervical back: Neck supple.      Right lower leg: No edema.      Left lower leg: No edema.   Lymphadenopathy:      Cervical: No cervical adenopathy.     Skin:     General: Skin is warm.     Neurological:      General: No focal deficit present.      Mental Status: He is alert.      Gait: Gait normal.     Psychiatric:         Mood and Affect: Mood normal.              [1]   Past Medical History:  Diagnosis Date    Anxiety     Hypertension    [2]   Past Surgical History:  Procedure Laterality Date    NO PAST SURGERIES     [3] No family history on file.  [4]   Allergies  Allergen Reactions    Statins Myalgia   [5]   Current Outpatient Medications on File Prior to Visit   Medication Sig Dispense Refill    amitriptyline (ELAVIL) 50 mg tablet TAKE 1 TABLET BY MOUTH EVERYDAY AT BEDTIME 90 tablet 0    benzonatate (TESSALON PERLES) 100 mg capsule Take 1 capsule (100 mg total) by mouth 3 (three) times a day as needed for cough 20 capsule 0    busPIRone (BUSPAR) 7.5 mg tablet Take 1 tablet (7.5 mg total) by mouth 2 (two) times a day 180 tablet 1    cetirizine (ZyrTEC) 10 mg tablet Take 10 mg by mouth daily      famotidine (PEPCID) 20 mg tablet Take 1 tablet (20 mg total) by mouth 2 (two) times a day 30 tablet 0    LORazepam (Ativan) 0.5 mg tablet Take one 30 minutes prior to flight may repeat x 1 6 tablet 0    metoprolol tartrate (LOPRESSOR) 25 mg tablet TAKE 1 TABLET (25 MG TOTAL) BY MOUTH EVERY 12 (TWELVE) HOURS 180 tablet 1     No current facility-administered medications on file prior to visit.   [6]   Social History  Tobacco Use    Smoking status: Former     Current  packs/day: 0.00     Types: Cigarettes     Quit date: 2014     Years since quittin.3    Smokeless tobacco: Former   Vaping Use    Vaping status: Never Used   Substance and Sexual Activity    Alcohol use: Yes     Comment: Socially    Drug use: Not Currently    Sexual activity: Yes     Partners: Female     Birth control/protection: None

## 2025-06-19 NOTE — PATIENT INSTRUCTIONS
"Patient Education     Routine physical for adults   The Basics   Written by the doctors and editors at [facility]   What is a physical? -- A physical is a routine visit, or \"check-up,\" with your doctor. You might also hear it called a \"wellness visit\" or \"preventive visit.\"  During each visit, the doctor will:   Ask about your physical and mental health   Ask about your habits, behaviors, and lifestyle   Do an exam   Give you vaccines if needed   Talk to you about any medicines you take   Give advice about your health   Answer your questions  Getting regular check-ups is an important part of taking care of your health. It can help your doctor find and treat any problems you have. But it's also important for preventing health problems.  A routine physical is different from a \"sick visit.\" A sick visit is when you see a doctor because of a health concern or problem. Since physicals are scheduled ahead of time, you can think about what you want to ask the doctor.  How often should I get a physical? -- It depends on your age and health. In general, for people age 21 years and older:   If you are younger than 50 years, you might be able to get a physical every 3 years.   If you are 50 years or older, your doctor might recommend a physical every year.  If you have an ongoing health condition, like diabetes or high blood pressure, your doctor will probably want to see you more often.  What happens during a physical? -- In general, each visit will include:   Physical exam - The doctor or nurse will check your height, weight, heart rate, and blood pressure. They will also look at your eyes and ears. They will ask about how you are feeling and whether you have any symptoms that bother you.   Medicines - It's a good idea to bring a list of all the medicines you take to each doctor visit. Your doctor will talk to you about your medicines and answer any questions. Tell them if you are having any side effects that bother you. You " "should also tell them if you are having trouble paying for any of your medicines.   Habits and behaviors - This includes:   Your diet   Your exercise habits   Whether you smoke, drink alcohol, or use drugs   Whether you are sexually active   Whether you feel safe at home  Your doctor will talk to you about things you can do to improve your health and lower your risk of health problems. They will also offer help and support. For example, if you want to quit smoking, they can give you advice and might prescribe medicines. If you want to improve your diet or get more physical activity, they can help you with this, too.   Lab tests, if needed - The tests you get will depend on your age and situation. For example, your doctor might want to check your:   Cholesterol   Blood sugar   Iron level   Vaccines - The recommended vaccines will depend on your age, health, and what vaccines you already had. Vaccines are very important because they can prevent certain serious or deadly infections.   Discussion of screening - \"Screening\" means checking for diseases or other health problems before they cause symptoms. Your doctor can recommend screening based on your age, risk, and preferences. This might include tests to check for:   Cancer, such as breast, prostate, cervical, ovarian, colorectal, prostate, lung, or skin cancer   Sexually transmitted infections, such as chlamydia and gonorrhea   Mental health conditions like depression and anxiety  Your doctor will talk to you about the different types of screening tests. They can help you decide which screenings to have. They can also explain what the results might mean.   Answering questions - The physical is a good time to ask the doctor or nurse questions about your health. If needed, they can refer you to other doctors or specialists, too.  Adults older than 65 years often need other care, too. As you get older, your doctor will talk to you about:   How to prevent falling at " home   Hearing or vision tests   Memory testing   How to take your medicines safely   Making sure that you have the help and support you need at home  All topics are updated as new evidence becomes available and our peer review process is complete.  This topic retrieved from [employer] on: May 02, 2024.  Topic 202793 Version 1.0  Release: 32.4.3 - C32.122  © 2024 UpToDate, Inc. and/or its affiliates. All rights reserved.  Consumer Information Use and Disclaimer   Disclaimer: This generalized information is a limited summary of diagnosis, treatment, and/or medication information. It is not meant to be comprehensive and should be used as a tool to help the user understand and/or assess potential diagnostic and treatment options. It does NOT include all information about conditions, treatments, medications, side effects, or risks that may apply to a specific patient. It is not intended to be medical advice or a substitute for the medical advice, diagnosis, or treatment of a health care provider based on the health care provider's examination and assessment of a patient's specific and unique circumstances. Patients must speak with a health care provider for complete information about their health, medical questions, and treatment options, including any risks or benefits regarding use of medications. This information does not endorse any treatments or medications as safe, effective, or approved for treating a specific patient. UpToDate, Inc. and its affiliates disclaim any warranty or liability relating to this information or the use thereof.The use of this information is governed by the Terms of Use, available at https://www.[employer].com/en/know/clinical-effectiveness-terms. 2024© UpToDate, Inc. and its affiliates and/or licensors. All rights reserved.  Copyright   © 2024 Up[employer]Date, Inc. and/or its affiliates. All rights reserved.

## 2025-06-19 NOTE — ASSESSMENT & PLAN NOTE
Currently on buspar 7.5 mg BID  Reports his cardiologist told him he should be on an SSRI  He reports he would like to wait to discuss this with his PCP  Therefore will follow up with PCP to discuss further

## 2025-06-19 NOTE — ASSESSMENT & PLAN NOTE
Chronic  Has seen cardiology for this   Continues on metoprolol 25 mg BID  Pulse in normal range today -- 79 bpm  Continue to monitor

## 2025-06-20 ENCOUNTER — RESULTS FOLLOW-UP (OUTPATIENT)
Dept: FAMILY MEDICINE CLINIC | Facility: CLINIC | Age: 50
End: 2025-06-20

## 2025-07-01 ENCOUNTER — HOSPITAL ENCOUNTER (OUTPATIENT)
Dept: ULTRASOUND IMAGING | Facility: CLINIC | Age: 50
Discharge: HOME | End: 2025-07-01
Payer: COMMERCIAL

## 2025-07-01 ENCOUNTER — APPOINTMENT (OUTPATIENT)
Dept: LAB | Facility: CLINIC | Age: 50
End: 2025-07-01
Payer: COMMERCIAL

## 2025-07-01 DIAGNOSIS — E78.5 HYPERLIPIDEMIA, UNSPECIFIED HYPERLIPIDEMIA TYPE: ICD-10-CM

## 2025-07-01 DIAGNOSIS — R79.89 ELEVATED LFTS: ICD-10-CM

## 2025-07-01 DIAGNOSIS — R00.0 SINUS TACHYCARDIA: ICD-10-CM

## 2025-07-01 LAB
CHOLEST SERPL-MCNC: 250 MG/DL (ref ?–200)
HDLC SERPL-MCNC: 39 MG/DL
LDLC SERPL CALC-MCNC: 176 MG/DL (ref 0–100)
TRIGL SERPL-MCNC: 176 MG/DL (ref ?–150)

## 2025-07-01 PROCEDURE — 80061 LIPID PANEL: CPT

## 2025-07-01 PROCEDURE — 36415 COLL VENOUS BLD VENIPUNCTURE: CPT

## 2025-07-01 PROCEDURE — 76705 ECHO EXAM OF ABDOMEN: CPT

## 2025-07-07 PROBLEM — R00.0 SINUS TACHYCARDIA: Status: RESOLVED | Noted: 2024-10-20 | Resolved: 2025-07-07

## 2025-07-08 ENCOUNTER — OFFICE VISIT (OUTPATIENT)
Dept: FAMILY MEDICINE CLINIC | Facility: CLINIC | Age: 50
End: 2025-07-08
Payer: COMMERCIAL

## 2025-07-08 VITALS
DIASTOLIC BLOOD PRESSURE: 90 MMHG | SYSTOLIC BLOOD PRESSURE: 145 MMHG | WEIGHT: 160 LBS | BODY MASS INDEX: 24.25 KG/M2 | HEART RATE: 98 BPM | TEMPERATURE: 96.9 F | OXYGEN SATURATION: 96 % | HEIGHT: 68 IN

## 2025-07-08 DIAGNOSIS — Z78.9 STATIN INTOLERANCE: ICD-10-CM

## 2025-07-08 DIAGNOSIS — F41.1 GENERALIZED ANXIETY DISORDER: Primary | ICD-10-CM

## 2025-07-08 DIAGNOSIS — I10 PRIMARY HYPERTENSION: ICD-10-CM

## 2025-07-08 DIAGNOSIS — R51.9 FREQUENT HEADACHES: ICD-10-CM

## 2025-07-08 DIAGNOSIS — E78.2 MIXED HYPERLIPIDEMIA: ICD-10-CM

## 2025-07-08 DIAGNOSIS — K76.0 HEPATIC STEATOSIS: ICD-10-CM

## 2025-07-08 PROCEDURE — 99214 OFFICE O/P EST MOD 30 MIN: CPT | Performed by: NURSE PRACTITIONER

## 2025-07-08 RX ORDER — BUSPIRONE HYDROCHLORIDE 10 MG/1
10 TABLET ORAL 2 TIMES DAILY
Qty: 60 TABLET | Refills: 3 | Status: SHIPPED | OUTPATIENT
Start: 2025-07-08 | End: 2025-10-06

## 2025-07-08 RX ORDER — AMITRIPTYLINE HYDROCHLORIDE 50 MG/1
25 TABLET ORAL
Start: 2025-07-08

## 2025-07-08 NOTE — ASSESSMENT & PLAN NOTE
Orders:    busPIRone (BUSPAR) 10 mg tablet; Take 1 tablet (10 mg total) by mouth 2 (two) times a day    amitriptyline (ELAVIL) 50 mg tablet; Take 0.5 tablets (25 mg total) by mouth daily at bedtime

## 2025-07-08 NOTE — ASSESSMENT & PLAN NOTE
Orders:    Ambulatory Referral to Cardiology; Future  Will refer to lipid specilaist to review his options for statin intolerance and elevated ASCVD risk score 7.7%

## 2025-07-08 NOTE — ASSESSMENT & PLAN NOTE
Elavil 50 mg patient has been having fatigue during the day and will cut the medication in half 25 mg

## 2025-07-08 NOTE — PROGRESS NOTES
Name: Robinson Chaudhari      : 1975      MRN: [id number]  Encounter Provider: ARACELIS Barajas  Encounter Date: 2025   Encounter department: [facility]  :  Assessment & Plan  Generalized anxiety disorder    Orders:    busPIRone (BUSPAR) 10 mg tablet; Take 1 tablet (10 mg total) by mouth 2 (two) times a day    amitriptyline (ELAVIL) 50 mg tablet; Take 0.5 tablets (25 mg total) by mouth daily at bedtime    Primary hypertension  Blood pressure is elevated in office today Metoprolol 25 mg bid        Frequent headaches  Elavil 50 mg patient has been having fatigue during the day and will cut the medication in half 25 mg          Hepatic steatosis    Orders:    Hepatic function panel; Future    Ambulatory Referral to Cardiology; Future    Mixed hyperlipidemia    Orders:    Ambulatory Referral to Cardiology; Future  Will refer to lipid specilaist to review his options for statin intolerance and elevated ASCVD risk score 7.7%  Statin intolerance    Orders:    Ambulatory Referral to Cardiology; Future    Mild fatty liver seen on the recent US of the liver will update levels in 4 weeks and if remaining elevated will refer to hepatology        History of Present Illness   Patient here today for a recheck on his blood pressure and reports that he has been checking at home and running from 130-160 SBP today is elevated in the office today and is running elevated in the office today. Patient has headaches and taking the amitriptyline and for his headaches. For the past several months he has been fatigued and not clear why so fatigued and he is also on anxiety medications and not helping him like they have used to and would like to try something else       Review of Systems   Constitutional:  Negative for activity change, appetite change, chills, diaphoresis, fatigue, fever and unexpected weight change.   HENT:  Negative for congestion, ear pain, hearing loss, postnasal drip, sinus  "pressure, sinus pain, sneezing and sore throat.    Eyes:  Negative for pain, redness and visual disturbance.   Respiratory:  Negative for cough and shortness of breath.    Cardiovascular:  Negative for chest pain and leg swelling.   Gastrointestinal:  Negative for abdominal pain, diarrhea, nausea and vomiting.   Musculoskeletal:  Negative for arthralgias.   Neurological:  Negative for dizziness and light-headedness.   Psychiatric/Behavioral:  Negative for behavioral problems and dysphoric mood. The patient is nervous/anxious.        Objective   /90   Pulse 98   Temp (!) 96.9 °F (36.1 °C)   Ht 5' 8\" (1.727 m)   Wt 72.6 kg (160 lb)   SpO2 96%   BMI 24.33 kg/m²      Physical Exam  Constitutional:       General: He is not in acute distress.     Appearance: He is well-developed.   HENT:      Head: Normocephalic and atraumatic.     Eyes:      Pupils: Pupils are equal, round, and reactive to light.     Neck:      Thyroid: No thyromegaly.     Cardiovascular:      Rate and Rhythm: Normal rate and regular rhythm.      Heart sounds: Normal heart sounds. No murmur heard.  Pulmonary:      Effort: Pulmonary effort is normal. No respiratory distress.      Breath sounds: Normal breath sounds. No wheezing.   Abdominal:      General: Bowel sounds are normal.      Palpations: Abdomen is soft.     Musculoskeletal:         General: Normal range of motion.      Cervical back: Normal range of motion.     Skin:     General: Skin is warm and dry.     Neurological:      Mental Status: He is alert and oriented to person, place, and time.       DEMETRIA-7 Flowsheet Screening      Flowsheet Row Most Recent Value   Over the last two weeks, how often have you been bothered by the following problems?     Feeling nervous, anxious, or on edge 3   Not being able to stop or control worrying 2   Worrying too much about different things 2   Trouble relaxing  2   Being so restless that it's hard to sit still 1   Becoming easily annoyed or " irritable  1   Feeling afraid as if something awful might happen 1   How difficult have these problems made it for you to do your work, take care of things at home, or get along with other people?  Somewhat difficult   DEMETRIA Score  12           PHQ-2/9 Depression Screening    Little interest or pleasure in doing things: 0 - not at all  Feeling down, depressed, or hopeless: 0 - not at all  PHQ-2 Score: 0  PHQ-2 Interpretation: Negative depression screen

## 2025-07-17 DIAGNOSIS — R12 HEART BURN: ICD-10-CM

## 2025-07-18 RX ORDER — FAMOTIDINE 20 MG/1
20 TABLET, FILM COATED ORAL 2 TIMES DAILY
Qty: 30 TABLET | Refills: 0 | Status: SHIPPED | OUTPATIENT
Start: 2025-07-18 | End: 2025-07-28

## 2025-07-26 DIAGNOSIS — R12 HEART BURN: ICD-10-CM

## 2025-07-28 RX ORDER — FAMOTIDINE 20 MG/1
20 TABLET, FILM COATED ORAL 2 TIMES DAILY
Qty: 180 TABLET | Refills: 1 | Status: SHIPPED | OUTPATIENT
Start: 2025-07-28

## 2025-08-02 ENCOUNTER — APPOINTMENT (OUTPATIENT)
Dept: LAB | Facility: CLINIC | Age: 50
End: 2025-08-02
Payer: COMMERCIAL

## 2025-08-02 DIAGNOSIS — K76.0 HEPATIC STEATOSIS: ICD-10-CM

## 2025-08-02 LAB
ALBUMIN SERPL BCG-MCNC: 4.4 G/DL (ref 3.5–5)
ALP SERPL-CCNC: 76 U/L (ref 34–104)
ALT SERPL W P-5'-P-CCNC: 32 U/L (ref 7–52)
AST SERPL W P-5'-P-CCNC: 21 U/L (ref 13–39)
BILIRUB DIRECT SERPL-MCNC: 0.15 MG/DL (ref 0–0.2)
BILIRUB SERPL-MCNC: 1.18 MG/DL (ref 0.2–1)
PROT SERPL-MCNC: 7.1 G/DL (ref 6.4–8.4)

## 2025-08-02 PROCEDURE — 80076 HEPATIC FUNCTION PANEL: CPT

## 2025-08-02 PROCEDURE — 36415 COLL VENOUS BLD VENIPUNCTURE: CPT

## 2025-08-03 DIAGNOSIS — F41.1 GENERALIZED ANXIETY DISORDER: ICD-10-CM

## 2025-08-05 RX ORDER — BUSPIRONE HYDROCHLORIDE 10 MG/1
10 TABLET ORAL 2 TIMES DAILY
Qty: 180 TABLET | Refills: 1 | Status: SHIPPED | OUTPATIENT
Start: 2025-08-05

## 2025-08-08 ENCOUNTER — OFFICE VISIT (OUTPATIENT)
Dept: FAMILY MEDICINE CLINIC | Facility: CLINIC | Age: 50
End: 2025-08-08
Payer: COMMERCIAL

## 2025-08-08 VITALS
OXYGEN SATURATION: 98 % | WEIGHT: 158.8 LBS | HEIGHT: 68 IN | SYSTOLIC BLOOD PRESSURE: 150 MMHG | DIASTOLIC BLOOD PRESSURE: 82 MMHG | HEART RATE: 85 BPM | BODY MASS INDEX: 24.07 KG/M2 | TEMPERATURE: 97.9 F

## 2025-08-08 DIAGNOSIS — R51.9 FREQUENT HEADACHES: ICD-10-CM

## 2025-08-08 DIAGNOSIS — F41.1 GENERALIZED ANXIETY DISORDER: ICD-10-CM

## 2025-08-08 DIAGNOSIS — E78.2 MIXED HYPERLIPIDEMIA: Primary | ICD-10-CM

## 2025-08-08 DIAGNOSIS — K76.0 HEPATIC STEATOSIS: ICD-10-CM

## 2025-08-08 DIAGNOSIS — I10 PRIMARY HYPERTENSION: ICD-10-CM

## 2025-08-08 PROBLEM — Z87.01 HISTORY OF PNEUMONIA: Status: RESOLVED | Noted: 2024-11-27 | Resolved: 2025-08-08

## 2025-08-08 PROCEDURE — 99213 OFFICE O/P EST LOW 20 MIN: CPT | Performed by: NURSE PRACTITIONER

## 2025-08-08 RX ORDER — LISINOPRIL 20 MG/1
20 TABLET ORAL DAILY
Qty: 90 TABLET | Refills: 1 | Status: SHIPPED | OUTPATIENT
Start: 2025-08-08 | End: 2026-02-04

## 2025-08-22 ENCOUNTER — OFFICE VISIT (OUTPATIENT)
Dept: FAMILY MEDICINE CLINIC | Facility: CLINIC | Age: 50
End: 2025-08-22
Payer: COMMERCIAL

## 2025-08-22 VITALS
HEART RATE: 82 BPM | HEIGHT: 68 IN | BODY MASS INDEX: 23.67 KG/M2 | OXYGEN SATURATION: 98 % | DIASTOLIC BLOOD PRESSURE: 80 MMHG | TEMPERATURE: 97.8 F | WEIGHT: 156.2 LBS | SYSTOLIC BLOOD PRESSURE: 138 MMHG

## 2025-08-22 DIAGNOSIS — I10 PRIMARY HYPERTENSION: Primary | ICD-10-CM

## 2025-08-22 PROCEDURE — 99213 OFFICE O/P EST LOW 20 MIN: CPT | Performed by: NURSE PRACTITIONER

## 2025-08-22 RX ORDER — METOPROLOL TARTRATE 25 MG/1
12.5 TABLET, FILM COATED ORAL EVERY 12 HOURS
Start: 2025-08-22